# Patient Record
Sex: FEMALE | Race: WHITE | NOT HISPANIC OR LATINO | Employment: OTHER | URBAN - METROPOLITAN AREA
[De-identification: names, ages, dates, MRNs, and addresses within clinical notes are randomized per-mention and may not be internally consistent; named-entity substitution may affect disease eponyms.]

---

## 2023-10-23 DIAGNOSIS — E78.00 HYPERCHOLESTEREMIA: Primary | ICD-10-CM

## 2023-10-23 RX ORDER — ATORVASTATIN CALCIUM 10 MG/1
10 TABLET, FILM COATED ORAL DAILY
Qty: 90 TABLET | Refills: 3 | Status: SHIPPED | OUTPATIENT
Start: 2023-10-23

## 2023-12-04 PROCEDURE — 87086 URINE CULTURE/COLONY COUNT: CPT

## 2023-12-05 ENCOUNTER — LAB REQUISITION (OUTPATIENT)
Dept: LAB | Facility: HOSPITAL | Age: 63
End: 2023-12-05
Payer: MEDICARE

## 2023-12-05 DIAGNOSIS — N39.0 URINARY TRACT INFECTION, SITE NOT SPECIFIED: ICD-10-CM

## 2023-12-05 LAB — BACTERIA UR CULT: NORMAL

## 2024-01-08 ENCOUNTER — LAB (OUTPATIENT)
Dept: LAB | Facility: LAB | Age: 64
End: 2024-01-08
Payer: MEDICARE

## 2024-01-08 DIAGNOSIS — M25.461 EFFUSION, RIGHT KNEE: Primary | ICD-10-CM

## 2024-01-08 LAB
CRP SERPL-MCNC: 1.1 MG/DL (ref 0–2)
ERYTHROCYTE [SEDIMENTATION RATE] IN BLOOD BY WESTERGREN METHOD: 6 MM/H (ref 0–30)

## 2024-01-08 PROCEDURE — 36415 COLL VENOUS BLD VENIPUNCTURE: CPT

## 2024-01-08 PROCEDURE — 85652 RBC SED RATE AUTOMATED: CPT

## 2024-01-08 PROCEDURE — 86140 C-REACTIVE PROTEIN: CPT

## 2024-01-16 ENCOUNTER — LAB (OUTPATIENT)
Dept: LAB | Facility: LAB | Age: 64
End: 2024-01-16
Payer: MEDICARE

## 2024-01-16 DIAGNOSIS — E78.00 PURE HYPERCHOLESTEROLEMIA, UNSPECIFIED: Primary | ICD-10-CM

## 2024-01-16 DIAGNOSIS — R03.0 ELEVATED BLOOD-PRESSURE READING, WITHOUT DIAGNOSIS OF HYPERTENSION: ICD-10-CM

## 2024-01-16 LAB
ALBUMIN SERPL-MCNC: 4.7 G/DL (ref 3.5–5)
ALP BLD-CCNC: 80 U/L (ref 35–125)
ALT SERPL-CCNC: 14 U/L (ref 5–40)
ANION GAP SERPL CALC-SCNC: 14 MMOL/L
APPEARANCE UR: CLEAR
AST SERPL-CCNC: 16 U/L (ref 5–40)
BASOPHILS # BLD AUTO: 0.1 X10*3/UL (ref 0–0.1)
BASOPHILS NFR BLD AUTO: 1 %
BILIRUB SERPL-MCNC: 0.4 MG/DL (ref 0.1–1.2)
BILIRUB UR STRIP.AUTO-MCNC: ABNORMAL MG/DL
BUN SERPL-MCNC: 12 MG/DL (ref 8–25)
CALCIUM SERPL-MCNC: 9.9 MG/DL (ref 8.5–10.4)
CHLORIDE SERPL-SCNC: 100 MMOL/L (ref 97–107)
CHOLEST SERPL-MCNC: 169 MG/DL (ref 133–200)
CHOLEST/HDLC SERPL: 4.6 {RATIO}
CO2 SERPL-SCNC: 25 MMOL/L (ref 24–31)
COLOR UR: ABNORMAL
CREAT SERPL-MCNC: 0.8 MG/DL (ref 0.4–1.6)
EGFRCR SERPLBLD CKD-EPI 2021: 82 ML/MIN/1.73M*2
EOSINOPHIL # BLD AUTO: 0.29 X10*3/UL (ref 0–0.7)
EOSINOPHIL NFR BLD AUTO: 3 %
ERYTHROCYTE [DISTWIDTH] IN BLOOD BY AUTOMATED COUNT: 12.6 % (ref 11.5–14.5)
GLUCOSE SERPL-MCNC: 97 MG/DL (ref 65–99)
GLUCOSE UR STRIP.AUTO-MCNC: NORMAL MG/DL
HCT VFR BLD AUTO: 37.4 % (ref 36–46)
HDLC SERPL-MCNC: 37 MG/DL
HGB BLD-MCNC: 12.2 G/DL (ref 12–16)
IMM GRANULOCYTES # BLD AUTO: 0.03 X10*3/UL (ref 0–0.7)
IMM GRANULOCYTES NFR BLD AUTO: 0.3 % (ref 0–0.9)
KETONES UR STRIP.AUTO-MCNC: NEGATIVE MG/DL
LDLC SERPL CALC-MCNC: 82 MG/DL (ref 65–130)
LEUKOCYTE ESTERASE UR QL STRIP.AUTO: NEGATIVE
LYMPHOCYTES # BLD AUTO: 3.49 X10*3/UL (ref 1.2–4.8)
LYMPHOCYTES NFR BLD AUTO: 35.9 %
MCH RBC QN AUTO: 31.4 PG (ref 26–34)
MCHC RBC AUTO-ENTMCNC: 32.6 G/DL (ref 32–36)
MCV RBC AUTO: 96 FL (ref 80–100)
MONOCYTES # BLD AUTO: 0.72 X10*3/UL (ref 0.1–1)
MONOCYTES NFR BLD AUTO: 7.4 %
NEUTROPHILS # BLD AUTO: 5.08 X10*3/UL (ref 1.2–7.7)
NEUTROPHILS NFR BLD AUTO: 52.4 %
NITRITE UR QL STRIP.AUTO: NEGATIVE
NRBC BLD-RTO: 0 /100 WBCS (ref 0–0)
PH UR STRIP.AUTO: 5.5 [PH]
PLATELET # BLD AUTO: 276 X10*3/UL (ref 150–450)
POTASSIUM SERPL-SCNC: 4.7 MMOL/L (ref 3.4–5.1)
PROT SERPL-MCNC: 7.4 G/DL (ref 5.9–7.9)
PROT UR STRIP.AUTO-MCNC: NEGATIVE MG/DL
RBC # BLD AUTO: 3.88 X10*6/UL (ref 4–5.2)
RBC # UR STRIP.AUTO: NEGATIVE /UL
SODIUM SERPL-SCNC: 139 MMOL/L (ref 133–145)
SP GR UR STRIP.AUTO: 1.01
TRIGL SERPL-MCNC: 249 MG/DL (ref 40–150)
UROBILINOGEN UR STRIP.AUTO-MCNC: NORMAL MG/DL
WBC # BLD AUTO: 9.7 X10*3/UL (ref 4.4–11.3)

## 2024-01-16 PROCEDURE — 81003 URINALYSIS AUTO W/O SCOPE: CPT

## 2024-01-16 PROCEDURE — 36415 COLL VENOUS BLD VENIPUNCTURE: CPT

## 2024-01-16 PROCEDURE — 80061 LIPID PANEL: CPT

## 2024-01-16 PROCEDURE — 80053 COMPREHEN METABOLIC PANEL: CPT

## 2024-01-16 PROCEDURE — 85025 COMPLETE CBC W/AUTO DIFF WBC: CPT

## 2024-01-19 ASSESSMENT — PROMIS GLOBAL HEALTH SCALE
RATE_SOCIAL_SATISFACTION: EXCELLENT
RATE_PHYSICAL_HEALTH: VERY GOOD
EMOTIONAL_PROBLEMS: NEVER
CARRYOUT_PHYSICAL_ACTIVITIES: COMPLETELY
RATE_GENERAL_HEALTH: VERY GOOD
CARRYOUT_SOCIAL_ACTIVITIES: EXCELLENT
RATE_MENTAL_HEALTH: EXCELLENT
RATE_QUALITY_OF_LIFE: EXCELLENT
RATE_AVERAGE_PAIN: 4

## 2024-01-22 ENCOUNTER — OFFICE VISIT (OUTPATIENT)
Dept: PRIMARY CARE | Facility: CLINIC | Age: 64
End: 2024-01-22
Payer: MEDICARE

## 2024-01-22 VITALS
HEIGHT: 64 IN | WEIGHT: 177 LBS | BODY MASS INDEX: 30.22 KG/M2 | HEART RATE: 111 BPM | DIASTOLIC BLOOD PRESSURE: 84 MMHG | OXYGEN SATURATION: 97 % | RESPIRATION RATE: 14 BRPM | SYSTOLIC BLOOD PRESSURE: 144 MMHG

## 2024-01-22 DIAGNOSIS — E78.00 PURE HYPERCHOLESTEROLEMIA: ICD-10-CM

## 2024-01-22 DIAGNOSIS — Z00.00 WELL ADULT EXAM: Primary | ICD-10-CM

## 2024-01-22 DIAGNOSIS — R73.01 IMPAIRED FASTING GLUCOSE: ICD-10-CM

## 2024-01-22 DIAGNOSIS — Z12.31 ENCOUNTER FOR SCREENING MAMMOGRAM FOR MALIGNANT NEOPLASM OF BREAST: ICD-10-CM

## 2024-01-22 DIAGNOSIS — Z00.00 WELCOME TO MEDICARE PREVENTIVE VISIT: ICD-10-CM

## 2024-01-22 DIAGNOSIS — K21.00 GASTROESOPHAGEAL REFLUX DISEASE WITH ESOPHAGITIS WITHOUT HEMORRHAGE: ICD-10-CM

## 2024-01-22 PROBLEM — R03.0 FINDING OF ABOVE NORMAL BLOOD PRESSURE: Status: ACTIVE | Noted: 2023-07-13

## 2024-01-22 PROCEDURE — 1036F TOBACCO NON-USER: CPT | Performed by: FAMILY MEDICINE

## 2024-01-22 PROCEDURE — G0438 PPPS, INITIAL VISIT: HCPCS | Performed by: FAMILY MEDICINE

## 2024-01-22 PROCEDURE — 99212 OFFICE O/P EST SF 10 MIN: CPT | Performed by: FAMILY MEDICINE

## 2024-01-22 PROCEDURE — 1159F MED LIST DOCD IN RCRD: CPT | Performed by: FAMILY MEDICINE

## 2024-01-22 PROCEDURE — 1125F AMNT PAIN NOTED PAIN PRSNT: CPT | Performed by: FAMILY MEDICINE

## 2024-01-22 PROCEDURE — 93000 ELECTROCARDIOGRAM COMPLETE: CPT | Performed by: FAMILY MEDICINE

## 2024-01-22 PROCEDURE — 3008F BODY MASS INDEX DOCD: CPT | Performed by: FAMILY MEDICINE

## 2024-01-22 RX ORDER — LORATADINE 10 MG/1
10 TABLET ORAL DAILY
COMMUNITY

## 2024-01-22 RX ORDER — ETODOLAC 500 MG/1
500 TABLET, FILM COATED ORAL DAILY
COMMUNITY
Start: 2023-03-10

## 2024-01-22 ASSESSMENT — ENCOUNTER SYMPTOMS
DEPRESSION: 0
LOSS OF SENSATION IN FEET: 0
OCCASIONAL FEELINGS OF UNSTEADINESS: 0

## 2024-01-22 ASSESSMENT — VISUAL ACUITY
OS_CC: 20/50
OD_CC: 20/50

## 2024-01-22 ASSESSMENT — PATIENT HEALTH QUESTIONNAIRE - PHQ9
2. FEELING DOWN, DEPRESSED OR HOPELESS: NOT AT ALL
SUM OF ALL RESPONSES TO PHQ9 QUESTIONS 1 AND 2: 0
1. LITTLE INTEREST OR PLEASURE IN DOING THINGS: NOT AT ALL

## 2024-01-22 ASSESSMENT — PAIN SCALES - GENERAL: PAINLEVEL: 3

## 2024-01-22 NOTE — ASSESSMENT & PLAN NOTE
Keep off medication.  I suspect there is an element of whitecoat syndrome.  Continue monitoring home BP.  If BP runs high at home she is to return here.

## 2024-01-22 NOTE — PROGRESS NOTES
Subjective   Reason for Visit: Mague Hummel is an 63 y.o. female here for a Medicare Wellness visit.          Reviewed all medications by prescribing practitioner or clinical pharmacist (such as prescriptions, OTCs, herbal therapies and supplements) and documented in the medical record.    HPI     Had benign breast mass (right) removed in 2/06. Colonoscopy in 2020 was normal by Dr. Goldstein. Repeat due in 2025 (family HX).   GYN history -. Last Pap was in 2021 and was normal . HPV was negative. Last period was in 2008. She has not had vaginal bleeding since then.  She had a uterine ablation in 2007.   An ultrasound in 2020 showed a uterine fibroid.  Since she currently is asymptomatic with that no treatment is necessary at this time.     2. MAGUE is seen today also for follow up of High cholesterol.  She is on atorvastatin 10 mg. Recent LDL is 82.     3. MAGUE is seen today also for follow up of allergies.  She takes Allegra. She also uses albuterol MDI PRN.     4. MAGUE is seen for also for follow up for GERD.  She is doing well on omeprazole.     5. MAGUE is seen also for follow up of Osteoarthritis. She is status post right knee replacement by Dr. Eller in 7/2024.  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 She takes etodolac.      6.  MAGUE is also here for elevated blood pressure.  She is on no medication.Home BPs usually in the 120s/70s.      7.  MAGUE  is also here for elevated sugar.  No history of diabetes.  There is a family history of diabetes. Recent FBS is 97. Recent A1C 5.8.    Review of Systems  Constitutional symptoms: No fever, loss of appetite, headaches, fatigue.  Eyes: Negative for vision loss or blurred or double vision.  ENT: Negative for hearing loss, tinnitus, nasal congestion, rhinorrhea, nosebleeds, teeth problems, mouth sores, sore throat or dysphagia.  Cardiovascular: Negative for chest pain/pressure, palpitations, edema, claudication.  Respiratory: Negative  "for shortness of breath, dyspnea on exertion, pain with breathing or coughing.  Breast: Negative for tenderness, masses, gynecomastia or discharge.  Gastrointestinal: Negative for anorexia, nausea, vomiting, indigestion, pain, change in bowel habits or blood in stool.  Musculoskeletal: Negative for joint pain, joint swelling, myalgias or cramps.  Skin: Negative for rash, changing skin lesions.  Neurological: Negative for headache, numbness, tingling, weakness or tremors.  Psychiatric: Negative for depression or anxiety.  Endocrine: Negative for marked change in weight, heat or cold intolerance, polyuria, polydipsia or polyphagia.  Hematologic: Negative for bruising or abnormal bleeding.    Objective   Vitals:  /84   Pulse (!) 111   Resp 14   Ht 1.619 m (5' 3.75\")   Wt 80.3 kg (177 lb)   SpO2 97%   BMI 30.62 kg/m²       Physical Exam  General appearance: Comfortable.  She is overweight.  She is awake, alert, and oriented and appears her stated age.The patient is cooperative with exam.  Head: Hair pattern reveals a normal pattern for patient's age and face shows no abnormalities.  eyes: PERRLA, EOMI x2, conjunctival and sclera are clear.   Nose: No polyps, ulcerations, or lesions.  Throat: Oral mucosa reveals no abnormalities and teeth are in good repair.  Neck:  Supple without lymphadenopathy.  No thyromegaly or carotid bruits.  Lungs: Clear to auscultation bilaterally with no wheezes, rales or rhonchi.  Chest Wall: No abnormalities  Breast: Bilateral breasts are symmetrical without masses or discharge or tenderness.  Abdomen: Abdomen is soft, nontender, no masses and no hepatosplenomegaly.  Genitourinary: Labia reveal no lesions.  Vaginal mucosa reveals no abnormalities.  Cervix reveals no abnormalities.  Negative chandelier sign.  Uterus is normal in size, shape and position.Bilateral adnexa reveals no masses or tenderness.  Lymph nodes: Bilateral axillary lymph nodes and inguinal nodes are " unremarkable.  Musculoskeletal: Bilateral upper and lower extremities are equal in strength at 5/5.  Skin: Good turgor and no rashes.  Neurological: Intact and nonfocal.  Cranial nerves II through XII are grossly intact.  Psychiatric: Patient has appropriate judgment.  Patient has good insight.  Patient's mood is appropriate.    Assessment/Plan   Problem List Items Addressed This Visit          High    Gastro-esophageal reflux disease with esophagitis    Current Assessment & Plan     Continue current medications.  Recheck in 6 months.           Impaired fasting glucose    Current Assessment & Plan     Continue off medication.  Continue to cut back on carbohydrates in her diet.  Will check A1c again next year.         Pure hypercholesterolemia    Current Assessment & Plan     Continue current medications.  Recheck in 6 months.           Well adult exam - Primary    Current Assessment & Plan     Anticipatory guidance given. She declines Prevnar 20 at this time.          Other Visit Diagnoses       Welcome to Medicare preventive visit        Relevant Orders    ECG 12 lead (Clinic Performed) (Completed)    BI mammo bilateral screening tomosynthesis    Encounter for screening mammogram for malignant neoplasm of breast        Relevant Orders    BI mammo bilateral screening tomosynthesis

## 2024-01-22 NOTE — ASSESSMENT & PLAN NOTE
Continue off medication.  Continue to cut back on carbohydrates in her diet.  Will check A1c again next year.

## 2024-01-30 ENCOUNTER — HOSPITAL ENCOUNTER (OUTPATIENT)
Dept: RADIOLOGY | Facility: CLINIC | Age: 64
Discharge: HOME | End: 2024-01-30
Payer: MEDICARE

## 2024-01-30 VITALS — BODY MASS INDEX: 29.02 KG/M2 | HEIGHT: 64 IN | WEIGHT: 170 LBS

## 2024-01-30 DIAGNOSIS — Z12.31 ENCOUNTER FOR SCREENING MAMMOGRAM FOR MALIGNANT NEOPLASM OF BREAST: ICD-10-CM

## 2024-01-30 DIAGNOSIS — Z00.00 WELCOME TO MEDICARE PREVENTIVE VISIT: ICD-10-CM

## 2024-01-30 PROCEDURE — 77063 BREAST TOMOSYNTHESIS BI: CPT

## 2024-04-01 ENCOUNTER — LAB REQUISITION (OUTPATIENT)
Dept: LAB | Facility: HOSPITAL | Age: 64
End: 2024-04-01
Payer: MEDICARE

## 2024-04-01 DIAGNOSIS — N39.0 URINARY TRACT INFECTION, SITE NOT SPECIFIED: ICD-10-CM

## 2024-04-01 PROCEDURE — 87186 SC STD MICRODIL/AGAR DIL: CPT

## 2024-04-01 PROCEDURE — 87086 URINE CULTURE/COLONY COUNT: CPT

## 2024-04-04 ENCOUNTER — TELEPHONE (OUTPATIENT)
Dept: PRIMARY CARE | Facility: CLINIC | Age: 64
End: 2024-04-04
Payer: MEDICARE

## 2024-04-04 DIAGNOSIS — N39.0 ACUTE UTI: Primary | ICD-10-CM

## 2024-04-04 LAB — BACTERIA UR CULT: ABNORMAL

## 2024-04-04 RX ORDER — NITROFURANTOIN 25; 75 MG/1; MG/1
100 CAPSULE ORAL 2 TIMES DAILY
Qty: 14 CAPSULE | Refills: 0 | Status: SHIPPED | OUTPATIENT
Start: 2024-04-04 | End: 2024-04-17 | Stop reason: HOSPADM

## 2024-04-04 NOTE — TELEPHONE ENCOUNTER
Patient informed voice educated and understanding. Patient has an appointment with JOSE F 4/11/2024

## 2024-04-04 NOTE — TELEPHONE ENCOUNTER
Please call patient to let her know that she will get better coverage by stopping her Keflex and starting Macrobid.  I sent in the prescription to [facility].  She should make an appointment in our office for recheck when the antibiotics are complete in one week.

## 2024-04-04 NOTE — TELEPHONE ENCOUNTER
Patient was d/x with an UTI on Monday 04/01/24. She went to an urgent care that day. Was put on Keflex 500 mg, take 4 times a day for 7 days.  Patient received a message today through her my chart stating her sample was also positive for Ecoli . Patient would like to know if what she is currently on, will that kill the Ecoli or does she need a new prescription? She does have knee surgery set for 04/16 with Dr Eller and needs to be infectious free. [facility] [place] [facility] [address]

## 2024-04-11 ENCOUNTER — APPOINTMENT (OUTPATIENT)
Dept: PREADMISSION TESTING | Facility: HOSPITAL | Age: 64
End: 2024-04-11
Payer: MEDICARE

## 2024-04-11 ENCOUNTER — OFFICE VISIT (OUTPATIENT)
Dept: PRIMARY CARE | Facility: CLINIC | Age: 64
End: 2024-04-11
Payer: MEDICARE

## 2024-04-11 VITALS
OXYGEN SATURATION: 96 % | SYSTOLIC BLOOD PRESSURE: 152 MMHG | RESPIRATION RATE: 16 BRPM | BODY MASS INDEX: 30.55 KG/M2 | TEMPERATURE: 98 F | DIASTOLIC BLOOD PRESSURE: 82 MMHG | WEIGHT: 178 LBS | HEART RATE: 110 BPM

## 2024-04-11 DIAGNOSIS — R03.0 FINDING OF ABOVE NORMAL BLOOD PRESSURE: ICD-10-CM

## 2024-04-11 DIAGNOSIS — Z01.818 PRE-OP EVALUATION: ICD-10-CM

## 2024-04-11 DIAGNOSIS — E78.00 PURE HYPERCHOLESTEROLEMIA: ICD-10-CM

## 2024-04-11 DIAGNOSIS — Z01.818 PRE-OPERATIVE EXAM: Primary | ICD-10-CM

## 2024-04-11 PROCEDURE — 93000 ELECTROCARDIOGRAM COMPLETE: CPT | Performed by: FAMILY MEDICINE

## 2024-04-11 PROCEDURE — 1159F MED LIST DOCD IN RCRD: CPT | Performed by: FAMILY MEDICINE

## 2024-04-11 PROCEDURE — 99214 OFFICE O/P EST MOD 30 MIN: CPT | Performed by: FAMILY MEDICINE

## 2024-04-11 PROCEDURE — 1036F TOBACCO NON-USER: CPT | Performed by: FAMILY MEDICINE

## 2024-04-11 PROCEDURE — 3008F BODY MASS INDEX DOCD: CPT | Performed by: FAMILY MEDICINE

## 2024-04-11 PROCEDURE — 1125F AMNT PAIN NOTED PAIN PRSNT: CPT | Performed by: FAMILY MEDICINE

## 2024-04-11 ASSESSMENT — PATIENT HEALTH QUESTIONNAIRE - PHQ9
2. FEELING DOWN, DEPRESSED OR HOPELESS: NOT AT ALL
1. LITTLE INTEREST OR PLEASURE IN DOING THINGS: NOT AT ALL
SUM OF ALL RESPONSES TO PHQ9 QUESTIONS 1 AND 2: 0

## 2024-04-11 ASSESSMENT — ENCOUNTER SYMPTOMS
LOSS OF SENSATION IN FEET: 0
OCCASIONAL FEELINGS OF UNSTEADINESS: 0
DEPRESSION: 0

## 2024-04-11 ASSESSMENT — PAIN SCALES - GENERAL: PAINLEVEL: 6

## 2024-04-11 NOTE — ASSESSMENT & PLAN NOTE
She just had extensive blood work for her physical in 1/24 with no concerns.  Will not get preoperative blood work at this time.  EKG shows no acute changes.  Will clear her for surgery.

## 2024-04-11 NOTE — PROGRESS NOTES
Subjective   Patient ID: Mague Hummel is a 63 y.o. female who presents for Pre-op Exam (Pre surgical clearance knee surgery 4/16/2024).    HPI   She is here for preop examination.  She is scheduled to have  right knee  surgery on 4/16/2024 by Dr. Eller. She had total knee replacement of the right in 7/23 and she has had increased swelling and redness over the past several months and is going to reopen it.    2. She also follow high cholesterol.  She is on a atorvastatin 10 mg.  Recent LDL is 82.    3. She is also here for elevated sugar.  She has no history of diabetes.  Recent A1c is 5.8 and FBS is 97.  She is on no medication.    4. She is also here for borderline elevated blood pressure.  She is on no medication.    Review of Systems  Denies headache, blurred vision, chest pain, shortness of breath, nausea or vomiting, change in bowel habits or leg pain or swelling.    Objective   /82 (BP Location: Left arm, Patient Position: Sitting, BP Cuff Size: Large adult)   Pulse 110   Temp 36.7 °C (98 °F)   Resp 16   Wt 80.7 kg (178 lb)   SpO2 96%   BMI 30.55 kg/m²     Physical Exam  Vitals and nurse's notes reviewed  General: no acute distress  HEENT: Normal  Neck: Supple  Lungs: Clear  Cardio: RRR w/o murmur  Abdomen: Soft, nontender, no hepatosplenomegaly  Extremities: No edema, no calf tenderness. Right knee with swelling and some warmth.  Neuro: Alert and oriented with no focal deficits    Assessment/Plan   Problem List Items Addressed This Visit             ICD-10-CM       High    Pure hypercholesterolemia E78.00     Continue current medication.            Medium    Finding of above normal blood pressure R03.0     Borderline elevation.  She is having preop evaluation at the hospital tomorrow so can recheck there.  At this point we will hold off in any medication.  Perhaps this is related to her pain.  Is not high enough to prevent surgery.  If she has persistent elevated blood pressure in the future  may need to start medication.         Pre-op evaluation Z01.818     She just had extensive blood work for her physical in 1/24 with no concerns.  Will not get preoperative blood work at this time.  EKG shows no acute changes.  Will clear her for surgery.          Other Visit Diagnoses         Codes    Pre-operative exam    -  Primary Z01.818    Relevant Orders    ECG 12 lead (Clinic Performed)

## 2024-04-11 NOTE — ASSESSMENT & PLAN NOTE
Borderline elevation.  She is having preop evaluation at the hospital tomorrow so can recheck there.  At this point we will hold off in any medication.  Perhaps this is related to her pain.  Is not high enough to prevent surgery.  If she has persistent elevated blood pressure in the future may need to start medication.

## 2024-04-12 ENCOUNTER — PRE-ADMISSION TESTING (OUTPATIENT)
Dept: PREADMISSION TESTING | Facility: HOSPITAL | Age: 64
End: 2024-04-12
Payer: MEDICARE

## 2024-04-12 ENCOUNTER — APPOINTMENT (OUTPATIENT)
Dept: PREADMISSION TESTING | Facility: HOSPITAL | Age: 64
End: 2024-04-12
Payer: MEDICARE

## 2024-04-12 VITALS
RESPIRATION RATE: 18 BRPM | OXYGEN SATURATION: 100 % | BODY MASS INDEX: 31.05 KG/M2 | HEIGHT: 64 IN | TEMPERATURE: 97.3 F | HEART RATE: 97 BPM | DIASTOLIC BLOOD PRESSURE: 72 MMHG | WEIGHT: 181.88 LBS | SYSTOLIC BLOOD PRESSURE: 168 MMHG

## 2024-04-12 DIAGNOSIS — Z01.818 PREOP TESTING: Primary | ICD-10-CM

## 2024-04-12 PROCEDURE — 87081 CULTURE SCREEN ONLY: CPT | Mod: WESLAB

## 2024-04-12 PROCEDURE — 99203 OFFICE O/P NEW LOW 30 MIN: CPT | Performed by: PHYSICIAN ASSISTANT

## 2024-04-12 RX ORDER — ACETAMINOPHEN 500 MG
1000 TABLET ORAL 2 TIMES DAILY
COMMUNITY

## 2024-04-12 RX ORDER — IBUPROFEN 800 MG/1
800 TABLET ORAL 2 TIMES DAILY
COMMUNITY

## 2024-04-12 RX ORDER — BISMUTH SUBSALICYLATE 262 MG
1 TABLET,CHEWABLE ORAL DAILY
COMMUNITY

## 2024-04-12 RX ORDER — CHLORHEXIDINE GLUCONATE ORAL RINSE 1.2 MG/ML
15 SOLUTION DENTAL AS NEEDED
Qty: 120 ML | Refills: 0 | Status: SHIPPED | OUTPATIENT
Start: 2024-04-12 | End: 2024-04-17 | Stop reason: HOSPADM

## 2024-04-12 ASSESSMENT — PAIN SCALES - GENERAL
PAINLEVEL_OUTOF10: 6
PAINLEVEL_OUTOF10: 6

## 2024-04-12 ASSESSMENT — DUKE ACTIVITY SCORE INDEX (DASI)
CAN YOU WALK INDOORS, SUCH AS AROUND YOUR HOUSE: YES
CAN YOU DO YARD WORK LIKE RAKING LEAVES, WEEDING OR PUSHING A MOWER: YES
CAN YOU RUN A SHORT DISTANCE: NO
DASI METS SCORE: 7.3
CAN YOU PARTICIPATE IN MODERATE RECREATIONAL ACTIVITIES LIKE GOLF, BOWLING, DANCING, DOUBLES TENNIS OR THROWING A BASEBALL OR FOOTBALL: NO
CAN YOU DO LIGHT WORK AROUND THE HOUSE LIKE DUSTING OR WASHING DISHES: YES
TOTAL_SCORE: 36.7
CAN YOU DO HEAVY WORK AROUND THE HOUSE LIKE SCRUBBING FLOORS OR LIFTING AND MOVING HEAVY FURNITURE: YES
CAN YOU HAVE SEXUAL RELATIONS: YES
CAN YOU TAKE CARE OF YOURSELF (EAT, DRESS, BATHE, OR USE TOILET): YES
CAN YOU CLIMB A FLIGHT OF STAIRS OR WALK UP A HILL: YES
CAN YOU PARTICIPATE IN STRENOUS SPORTS LIKE SWIMMING, SINGLES TENNIS, FOOTBALL, BASKETBALL, OR SKIING: NO
CAN YOU WALK A BLOCK OR TWO ON LEVEL GROUND: YES
CAN YOU DO MODERATE WORK AROUND THE HOUSE LIKE VACUUMING, SWEEPING FLOORS OR CARRYING GROCERIES: YES

## 2024-04-12 ASSESSMENT — PAIN DESCRIPTION - DESCRIPTORS
DESCRIPTORS: ACHING;SHARP;STABBING
DESCRIPTORS: ACHING;BURNING;THROBBING

## 2024-04-12 ASSESSMENT — CHADS2 SCORE
AGE GREATER THAN OR EQUAL TO 75: NO
DIABETES: NO
PRIOR STROKE OR TIA OR THROMBOEMBOLISM: NO
CHADS2 SCORE: 0
CHF: NO
HYPERTENSION: NO

## 2024-04-12 ASSESSMENT — PAIN - FUNCTIONAL ASSESSMENT
PAIN_FUNCTIONAL_ASSESSMENT: 0-10
PAIN_FUNCTIONAL_ASSESSMENT: 0-10

## 2024-04-12 ASSESSMENT — LIFESTYLE VARIABLES: SMOKING_STATUS: NONSMOKER

## 2024-04-12 NOTE — PREPROCEDURE INSTRUCTIONS
Medication List            Accurate as of April 12, 2024  9:22 AM. Always use your most recent med list.                acetaminophen 500 mg tablet  Commonly known as: Tylenol  Notes to patient: May take morning of surgery if needed     atorvastatin 10 mg tablet  Commonly known as: Lipitor  TAKE 1 TABLET DAILY     chlorhexidine 0.12 % solution  Commonly known as: Peridex  Use 15 mL in the mouth or throat if needed for wound care for up to 14 days.     Claritin 10 mg tablet  Generic drug: loratadine  Medication Adjustments for Surgery: Continue until night before surgery     etodolac 500 mg tablet  Commonly known as: Lodine  Medication Adjustments for Surgery: Stop 7 days before surgery     ibuprofen 800 mg tablet  Medication Adjustments for Surgery: Stop 7 days before surgery     multivitamin tablet  Medication Adjustments for Surgery: Stop 7 days before surgery     PriLOSEC 10 mg packet for oral suspension  Generic drug: omeprazole  Medication Adjustments for Surgery: Take morning of surgery with sip of water, no other fluids                              NPO Instructions:    Do not eat any food after midnight the night before your surgery/procedure.    Additional Instructions:     Day of Surgery:  Review your medication instructions, take indicated medications  Wear  comfortable loose fitting clothing  Do not use moisturizers, creams, lotions or perfume  All jewelry and valuables should be left at home   Home Preoperative Antibacterial Shower    What is a home preoperative antibacterial shower?  This shower is a way of cleaning the skin with a germ killing solution before surgery. The solution contains chlorhexidine, commonly known as CHG. CHG is a skin cleanser with germ killing ability. Let your doctor know if you are allergic to chlorhexidine.      Why do I need to take a preoperative antibacterial shower?  Skin is not sterile. It is best to try to make your skin as free of germs as possible before surgery.  Proper cleansing with a germ killing soap before surgery can lower the number of germs on your skin. This helps to reduce the risk of infection at the surgical site. Following the instructions listed below will help you prepare your skin for surgery.    How do I use the solution?      Steps: Begin using your CHG soap APRIL 12  First, wash and rinse your hair  Keep CHG away soap away from ear canals and eyes.  Rinse completely, do not condition. Hair extensions should be removed.  Wash your face with your normal soap and rinse.  Apply the CHG solution to a clean wet washcloth. Turn the water off or move away from the water spray to avoid premature rinsing of the CHG soap as you are applying.  Firmly lather your entire body from neck down. Do not use on face.  Pay special attention to the areas(s) where your incision(s) will be located unless they are on your face.  Avoid scrubbing your skin too hard.  The important point is to have the CHG soap sit on your skin for 3 minutes.  DO NOT wash with regular soap after you have used the CHG soap solution.  Pat yourself dry with a clean, freshly laundered towel.  DO NOT apply powders, deodorants or lotions.  Dress in clean, freshly laundered night clothes.  Be sure to sleep with clean, freshly laundered sheets.  Be aware that CHG will cause stains on fabrics; if you wash them with bleach after use. Rinse your washcloth and other linens that have contact with CHG completely. Use only non-chlorine detergents to launder the items used.  The morning of surgery is the fifth day. Repeat the above steps and dress in clean comfortable clothing.   Patient Information: Oral/Dental Rinse    What is oral/dental rinse?  It is a mouthwash. It is a way of cleaning the mouth with a germ killing solution before your surgery. The solution contains chlorhexidine, commonly know as CHG.  It is used inside the mouth to kill bacteria known as Staphylococcus aureus.  Let your doctor know if you are  allergic to chlorhexidine.    Why do I need to use CHG oral/dental rinse?  The CHG oral/dental rinse helps to kill bacteria in your mouth known as Staphylococcus aureus. This reduces the risk of infection at the surgical site.    Using your CHG oral/dental rinse.  STEPS: use your CHG oral/dental rinse after you brush your teeth the night before (at bedtime) and the morning of your surgery. Follow the directions on your prescription label.  Use the cap on the container to measure 15ml (fill cap to fill line)  Swish (gargle if you can) the mouthwash in your mouth for at least 30 seconds, (do not swallow) spit out.  After you use your CHG rinse, do not rinse your mouth with water, drink or eat. Please refer to prescription label for the appropriate time to resume oral intake.  PAT DISCHARGE INSTRUCTIONS    Please call the Same Day Surgery (SDS) Department of the hospital where your procedure will be performed after 2:00 PM the day before your surgery. If you are scheduled on a Monday, or a Tuesday following a Monday holiday, you will need to call on the last business day prior to your surgery.    [facility]  [id number] [place] [address], [id number] [phone]      Please let your surgeon know if:      You develop any open sores, shingles, burning or painful urination as these may increase your risk of an infection.   You no longer wish to have the surgery.   Any other personal circumstances change that may lead to the need to cancel or defer this surgery-such as being sick or getting admitted to any hospital within one week of your planned procedure.    Your contact details change, such as a change of address or phone number.    Starting now:     Please DO NOT drink alcohol or smoke for 24 hours before surgery. It is well known that quitting smoking can make a huge difference to your health and recovery from surgery. The longer you abstain from smoking, the better your chances of  a healthy recovery. If you need help with quitting, call 5-212-QUIT-NOW to be connected to a trained counselor who will discuss the best methods to help you quit.     Before your surgery:    Please stop all supplements 7 days prior to surgery. Or as directed by your surgeon.   Please stop taking NSAID pain medicine such as Advil and Motrin 7 days before surgery.    If you develop any fever, cough, cold, rashes, cuts, scratches, scrapes, urinary symptoms or infection anywhere on your body (including teeth and gums) prior to surgery, please call your surgeon’s office as soon as possible. This may require treatment to reduce the chance of cancellation on the day of surgery.    The day before your surgery:   DIET- Please follow the diet instructions at the top of your packet.   Get a good night’s rest.  Use the special soap for bathing if you have been instructed to use one.    Scheduled surgery times may change and you will be notified if this occurs - please check your personal voicemail for any updates.     On the morning of surgery:   Wear comfortable, loose fitting clothes which open in the front. Please do not wear moisturizers, creams, lotions, makeup or perfume.    Please bring with you to surgery:   Photo ID and insurance card   Current list of medicines and allergies   Pacemaker/ Defibrillator/Heart stent cards   CPAP machine and mask    Slings/ splints/ crutches   A copy of your complete advanced directive/DHPOA.    Please do NOT bring with you to surgery:   All jewelry and valuables should be left at home.   Prosthetic devices such as contact lenses, hearing aids, dentures, eyelash extensions, hairpins and body piercings must be removed prior to going in to the surgical suite.    After outpatient surgery:   A responsible adult MUST accompany you at the time of discharge and stay with you for 24 hours after your surgery. You may NOT drive yourself home after surgery.    Do not drive, operate machinery, make  critical decisions or do activities that require co-ordination or balance until after a night’s sleep.   Do not drink alcoholic beverages for 24 hours.   Instructions for resuming your medications will be provided by your surgeon.    CALL YOUR DOCTOR AFTER SURGERY IF YOU HAVE:     Chills and/or a fever of 101° F or higher.    Redness, swelling, pus or drainage from your surgical wound or a bad smell from the wound.    Lightheadedness, fainting or confusion.    Persistent vomiting (throwing up) and are not able to eat or drink for 12 hours.    Three or more loose, watery bowel movements in 24 hours (diarrhea).   Difficulty or pain while urinating( after non-urological surgery)    Pain and swelling in your legs, especially if it is only on one side.    Difficulty breathing or are breathing faster than normal.    Any new concerning symptoms.  What side effects might I have using the CHG oral/dental rinse?  CHG rinse will stick to the plaque on the teeth. Brush and floss just before use. Teeth brushing will help avoid staining of plaque during use.    Who should I contact if I have questions about the CHG oral/dental rinse?  Please call [facility], [facility] at [phone] if you have any questions.    Who should I call if I have any questions regarding the use of CHG soap?  Call the [facility] at [phone] if you have any questions.

## 2024-04-12 NOTE — CPM/PAT H&P
CPM/PAT Evaluation       Name: Mague Hummel (Mague Hummel)  /Age: 1960/63 y.o.     In-Person       Chief Complaint: Right knee pain    HPI 63-year-old female status post right total knee replacement 2023.  Patient complains of increasing pain, swelling and clicking noise in right knee.  Patient states that since November she has noticed increased swelling and pain.  She uses a cane for stability.  She has history of GERD and hyperlipidemia.  She is scheduled for synovectomy/bursectomy with possible poly change of right total knee 2024    Past Medical History:   Diagnosis Date    Allergic     Arthritis     Breast cyst     Cataract     Fibrocystic breast     GERD (gastroesophageal reflux disease)     Hyperlipidemia        Past Surgical History:   Procedure Laterality Date    ADENOIDECTOMY      BREAST CYST EXCISION      CATARACT EXTRACTION W/ INTRAOCULAR LENS  IMPLANT, BILATERAL       SECTION, LOW TRANSVERSE      CHOLECYSTECTOMY      ENDOMETRIAL ABLATION      TONSILLECTOMY  1964    TOTAL KNEE ARTHROPLASTY Right 2023    WISDOM TOOTH EXTRACTION         Patient  reports being sexually active and has had partner(s) who are male. She reports using the following method of birth control/protection: Post-menopausal.    Family History   Problem Relation Name Age of Onset    Breast cancer Mother Jolynn Bianca 68    Colon cancer Mother Jolynn Valenzuela     Arthritis Mother Jolynnsteve Valenzuela     Hypertension Mother Jolynn Valenzuela     Breast cancer Maternal Grandmother Radha Soto     COPD Father Marquise Valenzuela     Diabetes Father Marquise Valenzuela        Allergies   Allergen Reactions    Penicillins Hives    Sulfa (Sulfonamide Antibiotics) Hives    Tetracycline Unknown       Current Outpatient Medications   Medication Instructions    acetaminophen (TYLENOL) 1,000 mg, oral, 2 times daily    atorvastatin (LIPITOR) 10 mg, oral, Daily    chlorhexidine (Peridex) 0.12 %  solution 15 mL, Mouth/Throat, As needed    etodolac (Lodine) 500 mg tablet Take 1 tablet (500 mg) by mouth once daily.    ibuprofen 800 mg, oral, 2 times daily    loratadine (Claritin) 10 mg tablet Take 1 tablet (10 mg) by mouth once daily.    multivitamin tablet 1 tablet, oral, Daily    omeprazole (PriLOSEC) 10 mg packet for oral suspension 10 mL, oral, Daily     Review of Systems   Eyes:         Patient wears glasses   All other systems reviewed and are negative.       Physical Exam  Vitals reviewed. Physical exam within normal limits.   Constitutional:       Appearance: Normal appearance.   HENT:      Mouth/Throat:      Mouth: Mucous membranes are moist.   Eyes:      Extraocular Movements: Extraocular movements intact.      Pupils: Pupils are equal, round, and reactive to light.   Neck:      Vascular: No carotid bruit.   Cardiovascular:      Rate and Rhythm: Normal rate and regular rhythm.      Pulses: Normal pulses.      Heart sounds: Normal heart sounds.   Pulmonary:      Effort: Pulmonary effort is normal.      Breath sounds: Normal breath sounds.   Abdominal:      General: Bowel sounds are normal.      Palpations: Abdomen is soft.   Musculoskeletal:         General: Normal range of motion.      Cervical back: Normal range of motion.      Comments: Right knee swelling is present   Lymphadenopathy:      Cervical: No cervical adenopathy.   Skin:     General: Skin is warm and dry.   Neurological:      General: No focal deficit present.      Mental Status: She is alert and oriented to person, place, and time.      Gait: Gait abnormal.   Psychiatric:         Mood and Affect: Mood normal.         Behavior: Behavior normal.         Thought Content: Thought content normal.         Judgment: Judgment normal.          PAT AIRWAY:   Airway:     Mallampati::  I    Neck ROM::  Full      Vitals  Heart Rate:  []   Temp:  [36.3 °C (97.3 °F)-36.7 °C (98 °F)]   Resp:  [16-18]   BP: (152-168)/(72-82)   Height:  [162.6 cm  "(5' 4\")]   Weight:  [80.7 kg (178 lb)-82.5 kg (181 lb 14.1 oz)]   SpO2:  [96 %-100 %]        DASI Risk Score      Flowsheet Row Most Recent Value   DASI SCORE 36.7   METS Score (Will be calculated only when all the questions are answered) 7.3          Caprini DVT Assessment      Flowsheet Row Most Recent Value   DVT Score 12   Current Status Varicose veins, Elective major lower extremity arthroplasty   History Prior major surgery   Age 60-75 years   BMI 31-40 (Obesity)          Modified Frailty Index    No data to display       CHADS2 Stroke Risk  Current as of 28 minutes ago        N/A 3 to 100%: High Risk   2 to < 3%: Medium Risk   0 to < 2%: Low Risk     Last Change: N/A          This score determines the patient's risk of having a stroke if the patient has atrial fibrillation.        This score is not applicable to this patient. Components are not calculated.          Revised Cardiac Risk Index      Flowsheet Row Most Recent Value   Revised Cardiac Risk Calculator 0          Apfel Simplified Score      Flowsheet Row Most Recent Value   Apfel Simplified Score Calculator 2          Risk Analysis Index Results This Encounter    No data found in the last 1 encounters.       Stop Bang Score      Flowsheet Row Most Recent Value   Do you snore loudly? 0   Do you often feel tired or fatigued after your sleep? 0   Has anyone ever observed you stop breathing in your sleep? 0   Do you have or are you being treated for high blood pressure? 0   Recent BMI (Calculated) 31.2   Is BMI greater than 35 kg/m2? 0=No   Age older than 50 years old? 1=Yes   Is your neck circumference greater than 17 inches (Male) or 16 inches (Female)? 1   Gender - Male 0=No   STOP-BANG Total Score 2            Assessment and Plan:   -Inflammatory reaction right total knee     Plan: Synovectomy/bursectomy right knee 04/16/2024  -GERD stable on omeprazole  -Hyperlipidemia stable on Lipitor  -ASA 2   Caprini DVT score 12    Stop bang total score 2   " CHADS2 score 0   DASI score 36.7   METS score 7.3   RCRI score 0   Apfel score 2  -Labs 1/24/2024 reviewed, medically cleared by Dr. Pelletier 4/10/2024

## 2024-04-14 LAB — STAPHYLOCOCCUS SPEC CULT: NORMAL

## 2024-04-16 ENCOUNTER — ANESTHESIA EVENT (OUTPATIENT)
Dept: OPERATING ROOM | Facility: HOSPITAL | Age: 64
DRG: 486 | End: 2024-04-16
Payer: MEDICARE

## 2024-04-16 ENCOUNTER — HOSPITAL ENCOUNTER (OUTPATIENT)
Facility: HOSPITAL | Age: 64
Setting detail: OBSERVATION
LOS: 1 days | Discharge: HOME | DRG: 486 | End: 2024-04-17
Attending: ORTHOPAEDIC SURGERY | Admitting: ORTHOPAEDIC SURGERY
Payer: MEDICARE

## 2024-04-16 ENCOUNTER — ANESTHESIA (OUTPATIENT)
Dept: OPERATING ROOM | Facility: HOSPITAL | Age: 64
DRG: 486 | End: 2024-04-16
Payer: MEDICARE

## 2024-04-16 DIAGNOSIS — T84.53XA: Primary | ICD-10-CM

## 2024-04-16 PROCEDURE — 2500000004 HC RX 250 GENERAL PHARMACY W/ HCPCS (ALT 636 FOR OP/ED): Performed by: ORTHOPAEDIC SURGERY

## 2024-04-16 PROCEDURE — 3700000001 HC GENERAL ANESTHESIA TIME - INITIAL BASE CHARGE: Performed by: ORTHOPAEDIC SURGERY

## 2024-04-16 PROCEDURE — 2500000005 HC RX 250 GENERAL PHARMACY W/O HCPCS: Performed by: ORTHOPAEDIC SURGERY

## 2024-04-16 PROCEDURE — 7100000011 HC EXTENDED STAY RECOVERY HOURLY - NURSING UNIT

## 2024-04-16 PROCEDURE — G0378 HOSPITAL OBSERVATION PER HR: HCPCS

## 2024-04-16 PROCEDURE — 0SPC09Z REMOVAL OF LINER FROM RIGHT KNEE JOINT, OPEN APPROACH: ICD-10-PCS | Performed by: ORTHOPAEDIC SURGERY

## 2024-04-16 PROCEDURE — 2500000005 HC RX 250 GENERAL PHARMACY W/O HCPCS: Performed by: NURSE ANESTHETIST, CERTIFIED REGISTERED

## 2024-04-16 PROCEDURE — 96375 TX/PRO/DX INJ NEW DRUG ADDON: CPT | Mod: 59

## 2024-04-16 PROCEDURE — 1210000001 HC SEMI-PRIVATE ROOM DAILY

## 2024-04-16 PROCEDURE — 96365 THER/PROPH/DIAG IV INF INIT: CPT | Mod: 59

## 2024-04-16 PROCEDURE — 2500000004 HC RX 250 GENERAL PHARMACY W/ HCPCS (ALT 636 FOR OP/ED): Performed by: NURSE ANESTHETIST, CERTIFIED REGISTERED

## 2024-04-16 PROCEDURE — 3600000008 HC OR TIME - EACH INCREMENTAL 1 MINUTE - PROCEDURE LEVEL THREE: Performed by: ORTHOPAEDIC SURGERY

## 2024-04-16 PROCEDURE — A27335: Performed by: ANESTHESIOLOGY

## 2024-04-16 PROCEDURE — 2500000004 HC RX 250 GENERAL PHARMACY W/ HCPCS (ALT 636 FOR OP/ED): Performed by: ANESTHESIOLOGY

## 2024-04-16 PROCEDURE — 64447 NJX AA&/STRD FEMORAL NRV IMG: CPT | Performed by: ANESTHESIOLOGY

## 2024-04-16 PROCEDURE — 2500000001 HC RX 250 WO HCPCS SELF ADMINISTERED DRUGS (ALT 637 FOR MEDICARE OP): Performed by: ANESTHESIOLOGY

## 2024-04-16 PROCEDURE — 3700000002 HC GENERAL ANESTHESIA TIME - EACH INCREMENTAL 1 MINUTE: Performed by: ORTHOPAEDIC SURGERY

## 2024-04-16 PROCEDURE — 96360 HYDRATION IV INFUSION INIT: CPT

## 2024-04-16 PROCEDURE — A4649 SURGICAL SUPPLIES: HCPCS | Performed by: ORTHOPAEDIC SURGERY

## 2024-04-16 PROCEDURE — 87070 CULTURE OTHR SPECIMN AEROBIC: CPT | Mod: WESLAB,91 | Performed by: ORTHOPAEDIC SURGERY

## 2024-04-16 PROCEDURE — 2500000006 HC RX 250 W HCPCS SELF ADMINISTERED DRUGS (ALT 637 FOR ALL PAYERS): Mod: MUE | Performed by: ORTHOPAEDIC SURGERY

## 2024-04-16 PROCEDURE — 3600000003 HC OR TIME - INITIAL BASE CHARGE - PROCEDURE LEVEL THREE: Performed by: ORTHOPAEDIC SURGERY

## 2024-04-16 PROCEDURE — 0SUV09Z SUPPLEMENT RIGHT KNEE JOINT, TIBIAL SURFACE WITH LINER, OPEN APPROACH: ICD-10-PCS | Performed by: ORTHOPAEDIC SURGERY

## 2024-04-16 PROCEDURE — A9999 DME SUPPLY OR ACCESSORY, NOS: HCPCS | Mod: MUE | Performed by: ORTHOPAEDIC SURGERY

## 2024-04-16 PROCEDURE — 87075 CULTR BACTERIA EXCEPT BLOOD: CPT | Mod: WESLAB,91 | Performed by: ORTHOPAEDIC SURGERY

## 2024-04-16 PROCEDURE — A27335: Performed by: NURSE ANESTHETIST, CERTIFIED REGISTERED

## 2024-04-16 PROCEDURE — 7100000001 HC RECOVERY ROOM TIME - INITIAL BASE CHARGE: Performed by: ORTHOPAEDIC SURGERY

## 2024-04-16 PROCEDURE — 2780000003 HC OR 278 NO HCPCS: Performed by: ORTHOPAEDIC SURGERY

## 2024-04-16 PROCEDURE — 0SBC0ZZ EXCISION OF RIGHT KNEE JOINT, OPEN APPROACH: ICD-10-PCS | Performed by: ORTHOPAEDIC SURGERY

## 2024-04-16 PROCEDURE — 2500000001 HC RX 250 WO HCPCS SELF ADMINISTERED DRUGS (ALT 637 FOR MEDICARE OP): Performed by: ORTHOPAEDIC SURGERY

## 2024-04-16 PROCEDURE — 2720000007 HC OR 272 NO HCPCS: Performed by: ORTHOPAEDIC SURGERY

## 2024-04-16 PROCEDURE — 7100000002 HC RECOVERY ROOM TIME - EACH INCREMENTAL 1 MINUTE: Performed by: ORTHOPAEDIC SURGERY

## 2024-04-16 DEVICE — TIBIAL BEARING INSERT - PS
Type: IMPLANTABLE DEVICE | Site: KNEE | Status: FUNCTIONAL
Brand: TRIATHLON

## 2024-04-16 RX ORDER — PHENYLEPHRINE HCL IN 0.9% NACL 1 MG/10 ML
SYRINGE (ML) INTRAVENOUS AS NEEDED
Status: DISCONTINUED | OUTPATIENT
Start: 2024-04-16 | End: 2024-04-16

## 2024-04-16 RX ORDER — FENTANYL CITRATE 50 UG/ML
INJECTION, SOLUTION INTRAMUSCULAR; INTRAVENOUS AS NEEDED
Status: DISCONTINUED | OUTPATIENT
Start: 2024-04-16 | End: 2024-04-16

## 2024-04-16 RX ORDER — SODIUM CHLORIDE, SODIUM LACTATE, POTASSIUM CHLORIDE, CALCIUM CHLORIDE 600; 310; 30; 20 MG/100ML; MG/100ML; MG/100ML; MG/100ML
100 INJECTION, SOLUTION INTRAVENOUS CONTINUOUS
Status: DISCONTINUED | OUTPATIENT
Start: 2024-04-16 | End: 2024-04-16

## 2024-04-16 RX ORDER — OXYCODONE HYDROCHLORIDE 5 MG/1
10 TABLET ORAL EVERY 4 HOURS PRN
Status: DISCONTINUED | OUTPATIENT
Start: 2024-04-16 | End: 2024-04-17 | Stop reason: HOSPADM

## 2024-04-16 RX ORDER — ACETAMINOPHEN 325 MG/1
650 TABLET ORAL ONCE
Status: COMPLETED | OUTPATIENT
Start: 2024-04-16 | End: 2024-04-16

## 2024-04-16 RX ORDER — CEFAZOLIN SODIUM 2 G/100ML
2 INJECTION, SOLUTION INTRAVENOUS EVERY 8 HOURS
Status: COMPLETED | OUTPATIENT
Start: 2024-04-16 | End: 2024-04-17

## 2024-04-16 RX ORDER — PROPOFOL 10 MG/ML
INJECTION, EMULSION INTRAVENOUS AS NEEDED
Status: DISCONTINUED | OUTPATIENT
Start: 2024-04-16 | End: 2024-04-16

## 2024-04-16 RX ORDER — ALBUTEROL SULFATE 0.83 MG/ML
2.5 SOLUTION RESPIRATORY (INHALATION) ONCE
Status: DISCONTINUED | OUTPATIENT
Start: 2024-04-16 | End: 2024-04-16 | Stop reason: HOSPADM

## 2024-04-16 RX ORDER — FAMOTIDINE 20 MG/1
20 TABLET, FILM COATED ORAL ONCE
Status: COMPLETED | OUTPATIENT
Start: 2024-04-16 | End: 2024-04-16

## 2024-04-16 RX ORDER — ASPIRIN 81 MG/1
81 TABLET ORAL 2 TIMES DAILY
Status: DISCONTINUED | OUTPATIENT
Start: 2024-04-16 | End: 2024-04-17 | Stop reason: HOSPADM

## 2024-04-16 RX ORDER — OXYCODONE HCL 10 MG/1
20 TABLET, FILM COATED, EXTENDED RELEASE ORAL ONCE
Status: COMPLETED | OUTPATIENT
Start: 2024-04-16 | End: 2024-04-16

## 2024-04-16 RX ORDER — METOCLOPRAMIDE 10 MG/1
10 TABLET ORAL ONCE
Status: COMPLETED | OUTPATIENT
Start: 2024-04-16 | End: 2024-04-16

## 2024-04-16 RX ORDER — ACETAMINOPHEN 500 MG
1000 TABLET ORAL EVERY 6 HOURS
Status: DISCONTINUED | OUTPATIENT
Start: 2024-04-16 | End: 2024-04-17 | Stop reason: HOSPADM

## 2024-04-16 RX ORDER — CEFAZOLIN SODIUM 2 G/100ML
2 INJECTION, SOLUTION INTRAVENOUS ONCE
Status: COMPLETED | OUTPATIENT
Start: 2024-04-16 | End: 2024-04-16

## 2024-04-16 RX ORDER — PREGABALIN 75 MG/1
75 CAPSULE ORAL ONCE
Status: COMPLETED | OUTPATIENT
Start: 2024-04-16 | End: 2024-04-16

## 2024-04-16 RX ORDER — TRANEXAMIC ACID 100 MG/ML
INJECTION, SOLUTION INTRAVENOUS AS NEEDED
Status: DISCONTINUED | OUTPATIENT
Start: 2024-04-16 | End: 2024-04-16

## 2024-04-16 RX ORDER — CELECOXIB 200 MG/1
400 CAPSULE ORAL ONCE
Status: COMPLETED | OUTPATIENT
Start: 2024-04-16 | End: 2024-04-16

## 2024-04-16 RX ORDER — DOCUSATE SODIUM 100 MG/1
100 CAPSULE, LIQUID FILLED ORAL 2 TIMES DAILY
Status: DISCONTINUED | OUTPATIENT
Start: 2024-04-16 | End: 2024-04-17 | Stop reason: HOSPADM

## 2024-04-16 RX ORDER — DIPHENHYDRAMINE HYDROCHLORIDE 50 MG/ML
12.5 INJECTION INTRAMUSCULAR; INTRAVENOUS ONCE AS NEEDED
Status: DISCONTINUED | OUTPATIENT
Start: 2024-04-16 | End: 2024-04-16 | Stop reason: HOSPADM

## 2024-04-16 RX ORDER — MORPHINE SULFATE 2 MG/ML
2 INJECTION, SOLUTION INTRAMUSCULAR; INTRAVENOUS EVERY 2 HOUR PRN
Status: DISCONTINUED | OUTPATIENT
Start: 2024-04-16 | End: 2024-04-17 | Stop reason: HOSPADM

## 2024-04-16 RX ORDER — MIDAZOLAM HYDROCHLORIDE 1 MG/ML
2 INJECTION, SOLUTION INTRAMUSCULAR; INTRAVENOUS ONCE
Status: COMPLETED | OUTPATIENT
Start: 2024-04-16 | End: 2024-04-16

## 2024-04-16 RX ORDER — PANTOPRAZOLE SODIUM 20 MG/1
20 TABLET, DELAYED RELEASE ORAL DAILY
Status: DISCONTINUED | OUTPATIENT
Start: 2024-04-16 | End: 2024-04-17 | Stop reason: HOSPADM

## 2024-04-16 RX ORDER — ALBUTEROL SULFATE 0.83 MG/ML
2.5 SOLUTION RESPIRATORY (INHALATION) ONCE AS NEEDED
Status: DISCONTINUED | OUTPATIENT
Start: 2024-04-16 | End: 2024-04-16 | Stop reason: HOSPADM

## 2024-04-16 RX ORDER — OXYCODONE HCL 10 MG/1
10 TABLET, FILM COATED, EXTENDED RELEASE ORAL ONCE AS NEEDED
Status: DISCONTINUED | OUTPATIENT
Start: 2024-04-16 | End: 2024-04-17 | Stop reason: HOSPADM

## 2024-04-16 RX ORDER — SODIUM CHLORIDE, SODIUM LACTATE, POTASSIUM CHLORIDE, CALCIUM CHLORIDE 600; 310; 30; 20 MG/100ML; MG/100ML; MG/100ML; MG/100ML
125 INJECTION, SOLUTION INTRAVENOUS CONTINUOUS
Status: DISCONTINUED | OUTPATIENT
Start: 2024-04-16 | End: 2024-04-17 | Stop reason: HOSPADM

## 2024-04-16 RX ORDER — OXYCODONE HYDROCHLORIDE 5 MG/1
5 TABLET ORAL EVERY 4 HOURS PRN
Status: DISCONTINUED | OUTPATIENT
Start: 2024-04-16 | End: 2024-04-17 | Stop reason: HOSPADM

## 2024-04-16 RX ORDER — LIDOCAINE HYDROCHLORIDE 10 MG/ML
INJECTION INFILTRATION; PERINEURAL AS NEEDED
Status: DISCONTINUED | OUTPATIENT
Start: 2024-04-16 | End: 2024-04-16

## 2024-04-16 RX ORDER — ONDANSETRON HYDROCHLORIDE 2 MG/ML
INJECTION, SOLUTION INTRAVENOUS AS NEEDED
Status: DISCONTINUED | OUTPATIENT
Start: 2024-04-16 | End: 2024-04-16

## 2024-04-16 RX ORDER — HYDRALAZINE HYDROCHLORIDE 20 MG/ML
10 INJECTION INTRAMUSCULAR; INTRAVENOUS EVERY 30 MIN PRN
Status: DISCONTINUED | OUTPATIENT
Start: 2024-04-16 | End: 2024-04-16 | Stop reason: HOSPADM

## 2024-04-16 RX ORDER — FENTANYL CITRATE 50 UG/ML
50 INJECTION, SOLUTION INTRAMUSCULAR; INTRAVENOUS ONCE
Status: COMPLETED | OUTPATIENT
Start: 2024-04-16 | End: 2024-04-16

## 2024-04-16 RX ORDER — ONDANSETRON HYDROCHLORIDE 2 MG/ML
4 INJECTION, SOLUTION INTRAVENOUS ONCE AS NEEDED
Status: DISCONTINUED | OUTPATIENT
Start: 2024-04-16 | End: 2024-04-16 | Stop reason: HOSPADM

## 2024-04-16 RX ORDER — POLYETHYLENE GLYCOL 3350 17 G/17G
17 POWDER, FOR SOLUTION ORAL DAILY
Status: DISCONTINUED | OUTPATIENT
Start: 2024-04-16 | End: 2024-04-17 | Stop reason: HOSPADM

## 2024-04-16 RX ORDER — KETOROLAC TROMETHAMINE 30 MG/ML
15 INJECTION, SOLUTION INTRAMUSCULAR; INTRAVENOUS ONCE AS NEEDED
Status: DISCONTINUED | OUTPATIENT
Start: 2024-04-16 | End: 2024-04-16 | Stop reason: HOSPADM

## 2024-04-16 RX ORDER — KETOROLAC TROMETHAMINE 30 MG/ML
15 INJECTION, SOLUTION INTRAMUSCULAR; INTRAVENOUS EVERY 6 HOURS PRN
Status: DISCONTINUED | OUTPATIENT
Start: 2024-04-16 | End: 2024-04-17 | Stop reason: HOSPADM

## 2024-04-16 RX ORDER — ONDANSETRON HYDROCHLORIDE 2 MG/ML
4 INJECTION, SOLUTION INTRAVENOUS EVERY 8 HOURS PRN
Status: DISCONTINUED | OUTPATIENT
Start: 2024-04-16 | End: 2024-04-17 | Stop reason: HOSPADM

## 2024-04-16 RX ORDER — LABETALOL HYDROCHLORIDE 5 MG/ML
10 INJECTION, SOLUTION INTRAVENOUS ONCE AS NEEDED
Status: DISCONTINUED | OUTPATIENT
Start: 2024-04-16 | End: 2024-04-16 | Stop reason: HOSPADM

## 2024-04-16 RX ORDER — ATORVASTATIN CALCIUM 10 MG/1
10 TABLET, FILM COATED ORAL DAILY
Status: DISCONTINUED | OUTPATIENT
Start: 2024-04-16 | End: 2024-04-17 | Stop reason: HOSPADM

## 2024-04-16 RX ORDER — LORATADINE 10 MG/1
10 TABLET ORAL DAILY
Status: DISCONTINUED | OUTPATIENT
Start: 2024-04-16 | End: 2024-04-17 | Stop reason: HOSPADM

## 2024-04-16 RX ORDER — BISMUTH SUBSALICYLATE 262 MG
1 TABLET,CHEWABLE ORAL DAILY
Status: DISCONTINUED | OUTPATIENT
Start: 2024-04-16 | End: 2024-04-17 | Stop reason: HOSPADM

## 2024-04-16 RX ADMIN — FENTANYL CITRATE 50 MCG: 50 INJECTION, SOLUTION INTRAMUSCULAR; INTRAVENOUS at 13:08

## 2024-04-16 RX ADMIN — SODIUM CHLORIDE, SODIUM LACTATE, POTASSIUM CHLORIDE, AND CALCIUM CHLORIDE: 600; 310; 30; 20 INJECTION, SOLUTION INTRAVENOUS at 12:00

## 2024-04-16 RX ADMIN — FENTANYL CITRATE 50 MCG: 50 INJECTION, SOLUTION INTRAMUSCULAR; INTRAVENOUS at 10:54

## 2024-04-16 RX ADMIN — Medication 100 MCG: at 12:16

## 2024-04-16 RX ADMIN — FENTANYL CITRATE 50 MCG: 50 INJECTION, SOLUTION INTRAMUSCULAR; INTRAVENOUS at 11:10

## 2024-04-16 RX ADMIN — TRANEXAMIC ACID 1000 MG: 100 INJECTION, SOLUTION INTRAVENOUS at 12:14

## 2024-04-16 RX ADMIN — METOCLOPRAMIDE 10 MG: 10 TABLET ORAL at 09:38

## 2024-04-16 RX ADMIN — FAMOTIDINE 20 MG: 20 TABLET, FILM COATED ORAL at 09:38

## 2024-04-16 RX ADMIN — FENTANYL CITRATE 50 MCG: 50 INJECTION, SOLUTION INTRAMUSCULAR; INTRAVENOUS at 13:05

## 2024-04-16 RX ADMIN — CELECOXIB 400 MG: 200 CAPSULE ORAL at 09:38

## 2024-04-16 RX ADMIN — PREGABALIN 75 MG: 75 CAPSULE ORAL at 09:42

## 2024-04-16 RX ADMIN — HYDROMORPHONE HYDROCHLORIDE 0.5 MG: 1 INJECTION, SOLUTION INTRAMUSCULAR; INTRAVENOUS; SUBCUTANEOUS at 13:22

## 2024-04-16 RX ADMIN — ONDANSETRON 4 MG: 2 INJECTION INTRAMUSCULAR; INTRAVENOUS at 18:06

## 2024-04-16 RX ADMIN — CEFAZOLIN SODIUM 2 G: 2 INJECTION, SOLUTION INTRAVENOUS at 20:11

## 2024-04-16 RX ADMIN — ACETAMINOPHEN 650 MG: 325 TABLET ORAL at 09:38

## 2024-04-16 RX ADMIN — SODIUM CHLORIDE, SODIUM LACTATE, POTASSIUM CHLORIDE, AND CALCIUM CHLORIDE 125 ML/HR: 600; 310; 30; 20 INJECTION, SOLUTION INTRAVENOUS at 18:02

## 2024-04-16 RX ADMIN — FENTANYL CITRATE 25 MCG: 50 INJECTION, SOLUTION INTRAMUSCULAR; INTRAVENOUS at 11:20

## 2024-04-16 RX ADMIN — DOCUSATE SODIUM 100 MG: 100 CAPSULE, LIQUID FILLED ORAL at 20:11

## 2024-04-16 RX ADMIN — Medication 100 MCG: at 12:25

## 2024-04-16 RX ADMIN — Medication 100 MCG: at 12:19

## 2024-04-16 RX ADMIN — ONDANSETRON HYDROCHLORIDE 4 MG: 2 INJECTION INTRAMUSCULAR; INTRAVENOUS at 12:20

## 2024-04-16 RX ADMIN — PANTOPRAZOLE SODIUM 20 MG: 20 TABLET, DELAYED RELEASE ORAL at 18:06

## 2024-04-16 RX ADMIN — TRANEXAMIC ACID 1000 MG: 100 INJECTION, SOLUTION INTRAVENOUS at 11:00

## 2024-04-16 RX ADMIN — SODIUM CHLORIDE, SODIUM LACTATE, POTASSIUM CHLORIDE, AND CALCIUM CHLORIDE 100 ML/HR: 600; 310; 30; 20 INJECTION, SOLUTION INTRAVENOUS at 09:38

## 2024-04-16 RX ADMIN — OXYCODONE HYDROCHLORIDE 20 MG: 10 TABLET, FILM COATED, EXTENDED RELEASE ORAL at 09:38

## 2024-04-16 RX ADMIN — DEXAMETHASONE SODIUM PHOSPHATE 10 MG: 4 INJECTION, SOLUTION INTRAMUSCULAR; INTRAVENOUS at 11:01

## 2024-04-16 RX ADMIN — POVIDONE-IODINE 1 APPLICATION: 5 SOLUTION TOPICAL at 09:39

## 2024-04-16 RX ADMIN — FENTANYL CITRATE 50 MCG: 50 INJECTION INTRAMUSCULAR; INTRAVENOUS at 10:02

## 2024-04-16 RX ADMIN — CEFAZOLIN SODIUM 2 G: 2 INJECTION, SOLUTION INTRAVENOUS at 10:49

## 2024-04-16 RX ADMIN — ATORVASTATIN CALCIUM 10 MG: 10 TABLET, FILM COATED ORAL at 20:11

## 2024-04-16 RX ADMIN — Medication 100 MCG: at 11:16

## 2024-04-16 RX ADMIN — HYDROMORPHONE HYDROCHLORIDE 0.5 MG: 1 INJECTION, SOLUTION INTRAMUSCULAR; INTRAVENOUS; SUBCUTANEOUS at 13:34

## 2024-04-16 RX ADMIN — MIDAZOLAM HYDROCHLORIDE 2 MG: 1 INJECTION, SOLUTION INTRAMUSCULAR; INTRAVENOUS at 10:03

## 2024-04-16 RX ADMIN — FENTANYL CITRATE 25 MCG: 50 INJECTION, SOLUTION INTRAMUSCULAR; INTRAVENOUS at 12:57

## 2024-04-16 RX ADMIN — LIDOCAINE HYDROCHLORIDE 40 MG: 10 INJECTION, SOLUTION INFILTRATION; PERINEURAL at 10:54

## 2024-04-16 RX ADMIN — FENTANYL CITRATE 25 MCG: 50 INJECTION, SOLUTION INTRAMUSCULAR; INTRAVENOUS at 11:22

## 2024-04-16 RX ADMIN — PROPOFOL 150 MG: 10 INJECTION, EMULSION INTRAVENOUS at 10:54

## 2024-04-16 RX ADMIN — FENTANYL CITRATE 50 MCG: 50 INJECTION, SOLUTION INTRAMUSCULAR; INTRAVENOUS at 12:08

## 2024-04-16 RX ADMIN — ACETAMINOPHEN 1000 MG: 500 TABLET ORAL at 18:06

## 2024-04-16 RX ADMIN — FENTANYL CITRATE 50 MCG: 50 INJECTION, SOLUTION INTRAMUSCULAR; INTRAVENOUS at 11:25

## 2024-04-16 RX ADMIN — ASPIRIN 81 MG: 81 TABLET, COATED ORAL at 20:11

## 2024-04-16 RX ADMIN — FENTANYL CITRATE 25 MCG: 50 INJECTION, SOLUTION INTRAMUSCULAR; INTRAVENOUS at 12:48

## 2024-04-16 SDOH — SOCIAL STABILITY: SOCIAL INSECURITY: WERE YOU ABLE TO COMPLETE ALL THE BEHAVIORAL HEALTH SCREENINGS?: YES

## 2024-04-16 SDOH — SOCIAL STABILITY: SOCIAL INSECURITY: HAVE YOU HAD THOUGHTS OF HARMING ANYONE ELSE?: NO

## 2024-04-16 SDOH — SOCIAL STABILITY: SOCIAL INSECURITY: ARE YOU OR HAVE YOU BEEN THREATENED OR ABUSED PHYSICALLY, EMOTIONALLY, OR SEXUALLY BY ANYONE?: NO

## 2024-04-16 SDOH — SOCIAL STABILITY: SOCIAL INSECURITY: DO YOU FEEL ANYONE HAS EXPLOITED OR TAKEN ADVANTAGE OF YOU FINANCIALLY OR OF YOUR PERSONAL PROPERTY?: NO

## 2024-04-16 SDOH — HEALTH STABILITY: MENTAL HEALTH: CURRENT SMOKER: 0

## 2024-04-16 SDOH — SOCIAL STABILITY: SOCIAL INSECURITY: HAS ANYONE EVER THREATENED TO HURT YOUR FAMILY OR YOUR PETS?: NO

## 2024-04-16 SDOH — SOCIAL STABILITY: SOCIAL INSECURITY: DO YOU FEEL UNSAFE GOING BACK TO THE PLACE WHERE YOU ARE LIVING?: NO

## 2024-04-16 SDOH — SOCIAL STABILITY: SOCIAL INSECURITY: DOES ANYONE TRY TO KEEP YOU FROM HAVING/CONTACTING OTHER FRIENDS OR DOING THINGS OUTSIDE YOUR HOME?: NO

## 2024-04-16 SDOH — SOCIAL STABILITY: SOCIAL INSECURITY: ARE THERE ANY APPARENT SIGNS OF INJURIES/BEHAVIORS THAT COULD BE RELATED TO ABUSE/NEGLECT?: NO

## 2024-04-16 SDOH — SOCIAL STABILITY: SOCIAL INSECURITY: ABUSE: ADULT

## 2024-04-16 SDOH — SOCIAL STABILITY: SOCIAL INSECURITY: HAVE YOU HAD ANY THOUGHTS OF HARMING ANYONE ELSE?: NO

## 2024-04-16 ASSESSMENT — PAIN SCALES - GENERAL
PAINLEVEL_OUTOF10: 5 - MODERATE PAIN
PAIN_LEVEL: 2
PAINLEVEL_OUTOF10: 8
PAINLEVEL_OUTOF10: 4
PAINLEVEL_OUTOF10: 4
PAINLEVEL_OUTOF10: 0 - NO PAIN
PAINLEVEL_OUTOF10: 3
PAINLEVEL_OUTOF10: 4
PAINLEVEL_OUTOF10: 4

## 2024-04-16 ASSESSMENT — COGNITIVE AND FUNCTIONAL STATUS - GENERAL
DAILY ACTIVITIY SCORE: 24
CLIMB 3 TO 5 STEPS WITH RAILING: A LOT
PATIENT BASELINE BEDBOUND: NO
MOBILITY SCORE: 19
MOVING TO AND FROM BED TO CHAIR: A LITTLE
MOBILITY SCORE: 24
STANDING UP FROM CHAIR USING ARMS: A LITTLE
DAILY ACTIVITIY SCORE: 24
WALKING IN HOSPITAL ROOM: A LITTLE

## 2024-04-16 ASSESSMENT — PATIENT HEALTH QUESTIONNAIRE - PHQ9
1. LITTLE INTEREST OR PLEASURE IN DOING THINGS: NOT AT ALL
SUM OF ALL RESPONSES TO PHQ9 QUESTIONS 1 & 2: 0
2. FEELING DOWN, DEPRESSED OR HOPELESS: NOT AT ALL

## 2024-04-16 ASSESSMENT — LIFESTYLE VARIABLES
SKIP TO QUESTIONS 9-10: 1
HOW OFTEN DO YOU HAVE 6 OR MORE DRINKS ON ONE OCCASION: NEVER
HOW OFTEN DO YOU HAVE A DRINK CONTAINING ALCOHOL: 2-4 TIMES A MONTH
PRESCIPTION_ABUSE_PAST_12_MONTHS: NO
SUBSTANCE_ABUSE_PAST_12_MONTHS: NO
AUDIT-C TOTAL SCORE: 2
HOW MANY STANDARD DRINKS CONTAINING ALCOHOL DO YOU HAVE ON A TYPICAL DAY: 1 OR 2
AUDIT-C TOTAL SCORE: 2

## 2024-04-16 ASSESSMENT — ACTIVITIES OF DAILY LIVING (ADL)
ADEQUATE_TO_COMPLETE_ADL: YES
JUDGMENT_ADEQUATE_SAFELY_COMPLETE_DAILY_ACTIVITIES: YES
FEEDING YOURSELF: INDEPENDENT
TOILETING: INDEPENDENT
WALKS IN HOME: INDEPENDENT
ASSISTIVE_DEVICE: CANE;EYEGLASSES
DRESSING YOURSELF: INDEPENDENT
BATHING: INDEPENDENT
LACK_OF_TRANSPORTATION: NO
PATIENT'S MEMORY ADEQUATE TO SAFELY COMPLETE DAILY ACTIVITIES?: YES
HEARING - LEFT EAR: FUNCTIONAL
GROOMING: INDEPENDENT
HEARING - RIGHT EAR: FUNCTIONAL

## 2024-04-16 ASSESSMENT — PAIN - FUNCTIONAL ASSESSMENT
PAIN_FUNCTIONAL_ASSESSMENT: 0-10

## 2024-04-16 ASSESSMENT — PAIN DESCRIPTION - DESCRIPTORS
DESCRIPTORS: ACHING
DESCRIPTORS: ACHING;SHARP
DESCRIPTORS: ACHING

## 2024-04-16 ASSESSMENT — COLUMBIA-SUICIDE SEVERITY RATING SCALE - C-SSRS
1. IN THE PAST MONTH, HAVE YOU WISHED YOU WERE DEAD OR WISHED YOU COULD GO TO SLEEP AND NOT WAKE UP?: NO
6. HAVE YOU EVER DONE ANYTHING, STARTED TO DO ANYTHING, OR PREPARED TO DO ANYTHING TO END YOUR LIFE?: NO
2. HAVE YOU ACTUALLY HAD ANY THOUGHTS OF KILLING YOURSELF?: NO

## 2024-04-16 ASSESSMENT — PAIN SCALES - WONG BAKER: WONGBAKER_NUMERICALRESPONSE: NO HURT

## 2024-04-16 NOTE — ANESTHESIA POSTPROCEDURE EVALUATION
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, initial encounter (CMS-HCC)      (INFLAMMATORY REACTION RIGHT TOTAL KNEE)    Surgeons: Cordell Eller MD Responsible Provider: Cordell Petres DO    Anesthesia Type: general ASA Status: 3            Anesthesia Type: general    Vitals Value Taken Time   /75 04/16/24 1306   Temp 36 °C (96.8 °F) 04/16/24 1306   Pulse 94 04/16/24 1306   Resp nf 04/16/24 1323   SpO2 98 % 04/16/24 1306       Anesthesia Post Evaluation    Patient location during evaluation: bedside  Patient participation: complete - patient participated  Level of consciousness: awake  Pain score: 2  Pain management: adequate  Airway patency: patent  Two or more strategies used to mitigate risk of obstructive sleep apnea  Cardiovascular status: acceptable  Respiratory status: acceptable  Hydration status: acceptable  Postoperative Nausea and Vomiting: none        There were no known notable events for this encounter.

## 2024-04-16 NOTE — ANESTHESIA PREPROCEDURE EVALUATION
Patient: Mague Hummel    Procedure Information       Date/Time: 04/16/24 1015    Procedure: Synovectomy/Bursectomy Knee (Right: Knee)    Location: [facility] / [facility]    Surgeons: Cordell Eller MD            Relevant Problems   Cardiac   (+) Pure hypercholesterolemia       Clinical information reviewed:    Allergies  Meds               NPO Detail:  NPO/Void Status  Carbohydrate Drink Given Prior to Surgery? : N  Date of Last Liquid: 04/16/24  Time of Last Liquid: 2230  Date of Last Solid: 04/16/24  Time of Last Solid: 2230  Last Intake Type: Clear fluids  Time of Last Void: 0853         Physical Exam    Airway  Mallampati: II  TM distance: >3 FB  Neck ROM: full     Cardiovascular   Rhythm: regular  Rate: normal     Dental - normal exam     Pulmonary   Breath sounds clear to auscultation     Abdominal   Abdomen: soft             Anesthesia Plan    History of general anesthesia?: yes  History of complications of general anesthesia?: no    ASA 3     general     The patient is not a current smoker.    intravenous induction   Postoperative administration of opioids is intended.  Anesthetic plan and risks discussed with patient.    Plan discussed with CRNA, attending and CAA.

## 2024-04-16 NOTE — OP NOTE
Synovectomy/Bursectomy Knee  [address]27 - [place] ARTHRT W/SYNVCT KNE ANT&POST W/POP AREA   Operative Note     Date: 2024  OR Location: [facility]    Name: Mague Hummel : 1960, Age: 63 y.o., MRN: [id number], Sex: female    PRE Diagnosis  Inflammatory reaction right total knee replacement    POST Diagnosis  Same    Procedures  Arthrotomy right total knee with polyethylene exchange, synovectomy anterior and posterior joint capsule.    Surgeons      * Cordell Eller - Primary    Resident/Fellow/Other Assistant:  jimmy Flynn courtney    Procedure Summary  Implants:  Li triathlon X3 size 3 x 10 mm posterior stabilized tibial bearing insert    Anesthesia: General LMA with adductor canal block  ASA: III  Anesthesia Staff:   Anesthesiologist: Cordell Peters DO  CRNA: MARK Sanchez-JOYCE  SRNA: Xiomara Balbuena  Estimated Blood Loss: 75 mL    Specimen: 3 separate tissue cultures for routine aerobic and anaerobic extended incubation 2 weeks.    Staff:   Circulator: Romi Bragg RN; Alexandrea Katz RN; Jennifer Schumacher RN  Scrub Person: Carlos Stack; Jimmy Flynn; Sirena Ndiaye; Alexandrea Matamoros         Drains and/or Catheters:   1 Hemovac    Tourniquet Times:     Total Tourniquet Time Documented:  Thigh (Right) - 60 minutes  Total: Thigh (Right) - 60 minutes             Findings: Preoperative exam revealed a healed incision no erythema.  Range of motion was full extension and 95 degrees.  Benign soft tissues superficially.  There was a moderate-sized bloody joint effusion.  Thickened synovium.  Small osteolytic area of the distal femur at the superior tip of the anterior flange of the femoral component.  This extended 2 mm into the cortex and measured approximately 15 mm wide by 5 mm superior to the distal.  The components all appear to be well-fixed.  There was no purulence noted.  Complete synovectomy anterior and posteriorly was performed.  Polyethylene exchange for exposure purposes.   Knee range of motion was full flexion where thigh and calf impinge posteriorly and full extension with stable axial loading exam.  Patella tracked normally.    Indications: Mague Hummel is an 63 y.o. female who is having surgery for INFLAMMATORY REACTION RIGHT TOTAL KNEE.   Patient had a total knee replacement performed in July 2023 with press-fit Krypton triathlon components.  She did very well initially and had finished her postoperative therapy course.  Then around October or November she developed some intermittent swelling and pain episodes.  This became more persistent.  She was seen several times through her office visits for evaluation.  Aspiration initially revealed no fluid.  Second aspiration did reveal a bloody joint effusion.  She had persistent pain and swelling and difficulty with her range of motion.  She felt she lost significant range of motion and strength and balance over the course of the intervening several months.  Upon further evaluation I discussed with her of the potential for arthrotomy, tissue cultures and debridement.  Risks of this procedure including but not limited to infection DVT pulmonary embolism loosening of components periprosthetic fracture damage ligaments tendons neurovascular structures were discussed.  We also consented her for possible functional spacer revision if there is any loosening of the prosthetic components.  Despite the risks the patient did elect to proceed.    Synovasure aspiration from 03/07/2024 indicating a negative alpha defensin. Culture negative for seven days growth, aerobic and anaerobic. ID panel negative for genetic markers. Red blood cell count was 873,000, white blood cell count was 4,800, and neutrophils were 68%. ESR was 6 on 01/08/2024, CRP was 1.1. Given the constellation of information, her MSIS score is 5 indicating a possibly infected knee.      Procedure Details: Medications: Ancef 2 g IV, tranexamic acid 2 g IV, Decadron 10 mg  IV    Patient was seen and evaluated in the preoperative area.  Right leg marked as the operative extremity.  She was brought back to the operating room and anesthesia to control the head neck airway ministered general anesthetic.  She was supine on the table.  We placed a Sandhu catheter.  I placed a well-padded thigh tourniquet.  A small bump was placed under the operative leg.  I washed her leg with chlorhexidine alcohol prepped with a ChloraPrep and draped in usual manner with extra draping.  Patient leg was elevated no Esmarch was used and the tourniquet was elevated to 250 mmHg.    I marked out a previous incision anteriorly.  This was recreated with the scalpel.  The soft tissues were benign no fluid collections were noted.  I created a full-thickness skin flap medial and laterally.  Identified the previous subvastus arthrotomy with Ethibond suture closure.  I then marked out a medial parapatellar extension.  This was created with a scalpel.  A moderate-sized bloody joint effusion was removed.  I then placed Hohmann retractors medial and laterally.  I began excising the suprapatellar synovium.  I debrided from the joint capsule and tendon anteriorly to the gutters medial and laterally.  I took a portion of tissue for culture.  #1 culture was from the medial distal femoral bone prosthetic interface.  #2 culture was from the lateral gutter area.  I also everted the patella and circumferentially debrided around here.  I then inspected the bone prosthetic interface of the distal femur and patella and these appear to be intact.  There was a patch of developing osteolysis at the superior tip of the anterior flange of the femoral component.  After debridement with curettes and rongeur this area was contained.  Extended 2 mm deep into the cortical surface of the anterior femur.  Measured approximately 15 mm wide by 5 mm tall.  After thorough debridement of the soft tissues and I then flexed the knee.  I placed medial  lateral Hohmann retractors.  I debrided the intercondylar notch and this was tissue culture #3.  I then inspected the distal aspect of the medial and lateral femur and this still appeared to be intact no signs of loosening.  I inspected the bone prosthetic interface of the tibia and this appeared to be well-fixed as well.  I now used a curved osteotome to release the polyethylene lock anteriorly.  I remove the polyethylene and confirmed the previous size of 3 x 9 mm.  I then used a bone hook to elevate the posterior space and debrided the posterior capsule with pituitary rongeur.  I excised a portion of tissue medial and laterally that was overhanging the posterior prosthesis.  I used a curved osteotome to reflect the synovial folds from the distal posterior aspect of the femur.  After this debridement was completed I now irrigated with 3 L normal saline both the anterior and posterior aspects of the knee joint.  After irrigation I allowed Irrisept to settle into the wound for up to 5 minutes.  During this time I debrided the Ethibond sutures retained from the previous exposure.  I now irrigated the soft tissues and knee joint again with 3 L of normal saline.    The extra draping was now removed, new Bovie was opened, the suction tubing ,surgical team changed their gloves    I now flexed the knee and inspected the posterior capsule for debridement.  I placed Hohmann retractors medial laterally.  I then placed a size 10 mm insert trial and was satisfied with the kinematics.  She retained full extension stable to an axial loading exam and good flexion of the knee.  Patella tracked normally.  I now removed the trial component.  I cleaned the locking mechanism.  I now inserted a size 3 x 10 mm insert component.  The fit was secure medial and laterally.  I now extended the knee and irrigated with a diluted Betadine solution.  I allowed it to sit and then washed this out with normal saline through pulse lavage.  Total  irrigation approximately 3 L.      I now packed the knee and drop the tourniquet for hemostasis.  I placed 1 intra-articular drain.  I now closed the arthrotomy with a #1 looped PDS suture.  The knot was retained in the proximal aspect of the quadricep.  I then closed the superficial tissue with running 0 PDS, interrupted 2-0 Monocryl and 4-0 Monocryl on the skin.  Dermabond and Mepilex dressing was applied.  Patient taught procedure well awakened taken to PACU in stable condition no complications encountered.  Complications:  None; patient tolerated the procedure well.    Disposition: PACU - hemodynamically stable.  Condition: stable             Attending Attestation: I performed the procedure.    Cordell Eller  Phone Number: [phone]

## 2024-04-16 NOTE — ANESTHESIA PROCEDURE NOTES
Peripheral Block    Patient location during procedure: pre-op  Start time: 4/16/2024 10:05 AM  End time: 4/16/2024 10:07 AM  Reason for block: at surgeon's request  Staffing  Performed: attending and CRNA   Authorized by: Cordell Peters DO    Performed by: Cordell Peters DO  Preanesthetic Checklist  Completed: patient identified, IV checked, site marked, risks and benefits discussed, surgical consent, monitors and equipment checked, pre-op evaluation and timeout performed   Timeout performed at: 4/16/2024 10:01 AM  Peripheral Block  Patient position: laying flat  Prep: ChloraPrep  Patient monitoring: heart rate, cardiac monitor and continuous pulse ox  Block type: adductor canal  Laterality: right  Injection technique: single-shot  Guidance: ultrasound guided  Local infiltration: ropivacaine  Infiltration strength: 0.5 %  Dose: 22 mL  Needle  Needle type: short-bevel   Needle gauge: 22 G  Needle length: 10 cm  Needle localization: ultrasound guidance     image stored in chart  Needle insertion depth: 5 cm  Assessment  Injection assessment: negative aspiration for heme, no paresthesia on injection, incremental injection and local visualized surrounding nerve on ultrasound  Paresthesia pain: none  Heart rate change: no  Slow fractionated injection: yes

## 2024-04-16 NOTE — NURSING NOTE
Pt A&O x3 arrived to floor from PACU. Mild complaints of nausea noted. Pt orientated to room, call light, and phone. Call light within reach. Pt to be on IV fluids per order. Dsg,hemovac, and cruz in place per order.

## 2024-04-16 NOTE — ANESTHESIA PROCEDURE NOTES
Airway  Date/Time: 4/16/2024 10:56 AM  Urgency: elective    Airway not difficult    Staffing  Performed: EMILIANO   Authorized by: Cordell Peters DO    Performed by: MARK Sanchez-CRNA  Patient location during procedure: OR    Indications and Patient Condition  Indications for airway management: anesthesia  Spontaneous Ventilation: absent  Sedation level: deep  Preoxygenated: yes  Patient position: sniffing  MILS maintained throughout  Mask difficulty assessment: 0 - not attempted    Final Airway Details  Final airway type: supraglottic airway      Successful airway: Size 4     Number of attempts at approach: 1

## 2024-04-16 NOTE — CARE PLAN
The patient's goals for the shift include comfort    The clinical goals for the shift include IV fliuds and pain management

## 2024-04-17 ENCOUNTER — DOCUMENTATION (OUTPATIENT)
Dept: HOME HEALTH SERVICES | Facility: HOME HEALTH | Age: 64
End: 2024-04-17
Payer: MEDICARE

## 2024-04-17 ENCOUNTER — HOME HEALTH ADMISSION (OUTPATIENT)
Dept: HOME HEALTH SERVICES | Facility: HOME HEALTH | Age: 64
End: 2024-04-17
Payer: MEDICARE

## 2024-04-17 ENCOUNTER — PHARMACY VISIT (OUTPATIENT)
Dept: PHARMACY | Facility: CLINIC | Age: 64
End: 2024-04-17
Payer: MEDICARE

## 2024-04-17 VITALS
BODY MASS INDEX: 31.05 KG/M2 | WEIGHT: 181.88 LBS | HEART RATE: 92 BPM | TEMPERATURE: 98.1 F | DIASTOLIC BLOOD PRESSURE: 66 MMHG | RESPIRATION RATE: 17 BRPM | OXYGEN SATURATION: 97 % | HEIGHT: 64 IN | SYSTOLIC BLOOD PRESSURE: 167 MMHG

## 2024-04-17 LAB
ANION GAP SERPL CALC-SCNC: 13 MMOL/L
BUN SERPL-MCNC: 11 MG/DL (ref 8–25)
CALCIUM SERPL-MCNC: 9.1 MG/DL (ref 8.5–10.4)
CHLORIDE SERPL-SCNC: 102 MMOL/L (ref 97–107)
CO2 SERPL-SCNC: 24 MMOL/L (ref 24–31)
CREAT SERPL-MCNC: 0.6 MG/DL (ref 0.4–1.6)
EGFRCR SERPLBLD CKD-EPI 2021: >90 ML/MIN/1.73M*2
ERYTHROCYTE [DISTWIDTH] IN BLOOD BY AUTOMATED COUNT: 12.8 % (ref 11.5–14.5)
GLUCOSE SERPL-MCNC: 118 MG/DL (ref 65–99)
HCT VFR BLD AUTO: 31.6 % (ref 36–46)
HGB BLD-MCNC: 10.3 G/DL (ref 12–16)
IRON SATN MFR SERPL: 11 % (ref 12–50)
IRON SERPL-MCNC: 34 UG/DL (ref 30–160)
MCH RBC QN AUTO: 31.5 PG (ref 26–34)
MCHC RBC AUTO-ENTMCNC: 32.6 G/DL (ref 32–36)
MCV RBC AUTO: 97 FL (ref 80–100)
NRBC BLD-RTO: 0 /100 WBCS (ref 0–0)
PLATELET # BLD AUTO: 296 X10*3/UL (ref 150–450)
POTASSIUM SERPL-SCNC: 4 MMOL/L (ref 3.4–5.1)
RBC # BLD AUTO: 3.27 X10*6/UL (ref 4–5.2)
SODIUM SERPL-SCNC: 139 MMOL/L (ref 133–145)
TIBC SERPL-MCNC: 302 UG/DL (ref 228–428)
UIBC SERPL-MCNC: 268 UG/DL (ref 110–370)
WBC # BLD AUTO: 15.1 X10*3/UL (ref 4.4–11.3)

## 2024-04-17 PROCEDURE — 97161 PT EVAL LOW COMPLEX 20 MIN: CPT | Mod: GP | Performed by: PHYSICAL THERAPIST

## 2024-04-17 PROCEDURE — 96375 TX/PRO/DX INJ NEW DRUG ADDON: CPT

## 2024-04-17 PROCEDURE — 2500000001 HC RX 250 WO HCPCS SELF ADMINISTERED DRUGS (ALT 637 FOR MEDICARE OP): Performed by: ORTHOPAEDIC SURGERY

## 2024-04-17 PROCEDURE — 83540 ASSAY OF IRON: CPT | Performed by: NURSE PRACTITIONER

## 2024-04-17 PROCEDURE — 36415 COLL VENOUS BLD VENIPUNCTURE: CPT | Performed by: ORTHOPAEDIC SURGERY

## 2024-04-17 PROCEDURE — 96361 HYDRATE IV INFUSION ADD-ON: CPT

## 2024-04-17 PROCEDURE — 2500000004 HC RX 250 GENERAL PHARMACY W/ HCPCS (ALT 636 FOR OP/ED): Performed by: ORTHOPAEDIC SURGERY

## 2024-04-17 PROCEDURE — 97166 OT EVAL MOD COMPLEX 45 MIN: CPT | Mod: GO

## 2024-04-17 PROCEDURE — 2500000004 HC RX 250 GENERAL PHARMACY W/ HCPCS (ALT 636 FOR OP/ED): Performed by: NURSE PRACTITIONER

## 2024-04-17 PROCEDURE — 97530 THERAPEUTIC ACTIVITIES: CPT | Mod: GO

## 2024-04-17 PROCEDURE — G0378 HOSPITAL OBSERVATION PER HR: HCPCS

## 2024-04-17 PROCEDURE — 85027 COMPLETE CBC AUTOMATED: CPT | Performed by: ORTHOPAEDIC SURGERY

## 2024-04-17 PROCEDURE — RXMED WILLOW AMBULATORY MEDICATION CHARGE

## 2024-04-17 PROCEDURE — 99222 1ST HOSP IP/OBS MODERATE 55: CPT | Performed by: NURSE PRACTITIONER

## 2024-04-17 PROCEDURE — 2500000006 HC RX 250 W HCPCS SELF ADMINISTERED DRUGS (ALT 637 FOR ALL PAYERS): Mod: MUE | Performed by: ORTHOPAEDIC SURGERY

## 2024-04-17 PROCEDURE — 97530 THERAPEUTIC ACTIVITIES: CPT | Mod: GP | Performed by: PHYSICAL THERAPIST

## 2024-04-17 PROCEDURE — 80048 BASIC METABOLIC PNL TOTAL CA: CPT | Performed by: ORTHOPAEDIC SURGERY

## 2024-04-17 RX ORDER — ACETAMINOPHEN 500 MG
1000 TABLET ORAL EVERY 6 HOURS PRN
Qty: 120 TABLET | Refills: 0 | Status: SHIPPED | OUTPATIENT
Start: 2024-04-17 | End: 2024-05-02

## 2024-04-17 RX ORDER — OXYCODONE HYDROCHLORIDE 5 MG/1
5 TABLET ORAL EVERY 4 HOURS PRN
Qty: 40 TABLET | Refills: 0 | Status: SHIPPED | OUTPATIENT
Start: 2024-04-17 | End: 2024-04-24

## 2024-04-17 RX ORDER — IBUPROFEN 600 MG/1
600 TABLET ORAL EVERY 6 HOURS PRN
Qty: 20 TABLET | Refills: 0 | Status: SHIPPED | OUTPATIENT
Start: 2024-04-17 | End: 2024-04-22

## 2024-04-17 RX ORDER — ASPIRIN 81 MG/1
81 TABLET ORAL 2 TIMES DAILY
Qty: 56 TABLET | Refills: 0 | Status: SHIPPED | OUTPATIENT
Start: 2024-04-17 | End: 2024-05-15

## 2024-04-17 RX ORDER — DOCUSATE SODIUM 100 MG/1
100 CAPSULE, LIQUID FILLED ORAL 2 TIMES DAILY
Qty: 30 CAPSULE | Refills: 0 | Status: SHIPPED | OUTPATIENT
Start: 2024-04-17 | End: 2024-05-02

## 2024-04-17 RX ADMIN — ACETAMINOPHEN 1000 MG: 500 TABLET ORAL at 13:25

## 2024-04-17 RX ADMIN — PANTOPRAZOLE SODIUM 20 MG: 20 TABLET, DELAYED RELEASE ORAL at 08:58

## 2024-04-17 RX ADMIN — ASPIRIN 81 MG: 81 TABLET, COATED ORAL at 08:58

## 2024-04-17 RX ADMIN — OXYCODONE 10 MG: 5 TABLET ORAL at 13:25

## 2024-04-17 RX ADMIN — ACETAMINOPHEN 1000 MG: 500 TABLET ORAL at 00:20

## 2024-04-17 RX ADMIN — OXYCODONE 10 MG: 5 TABLET ORAL at 08:58

## 2024-04-17 RX ADMIN — ACETAMINOPHEN 1000 MG: 500 TABLET ORAL at 05:46

## 2024-04-17 RX ADMIN — IRON SUCROSE 200 MG: 20 INJECTION, SOLUTION INTRAVENOUS at 08:57

## 2024-04-17 RX ADMIN — MULTIVITAMIN TABLET 1 TABLET: TABLET at 08:58

## 2024-04-17 RX ADMIN — POLYETHYLENE GLYCOL 3350 17 G: 17 POWDER, FOR SOLUTION ORAL at 08:58

## 2024-04-17 RX ADMIN — CEFAZOLIN SODIUM 2 G: 2 INJECTION, SOLUTION INTRAVENOUS at 04:00

## 2024-04-17 RX ADMIN — LORATADINE 10 MG: 10 TABLET ORAL at 08:58

## 2024-04-17 RX ADMIN — DOCUSATE SODIUM 100 MG: 100 CAPSULE, LIQUID FILLED ORAL at 08:57

## 2024-04-17 RX ADMIN — OXYCODONE 5 MG: 5 TABLET ORAL at 04:08

## 2024-04-17 RX ADMIN — SODIUM CHLORIDE, SODIUM LACTATE, POTASSIUM CHLORIDE, AND CALCIUM CHLORIDE 125 ML/HR: 600; 310; 30; 20 INJECTION, SOLUTION INTRAVENOUS at 04:00

## 2024-04-17 SDOH — ECONOMIC STABILITY: HOUSING INSECURITY: IN THE LAST 12 MONTHS, WAS THERE A TIME WHEN YOU WERE NOT ABLE TO PAY THE MORTGAGE OR RENT ON TIME?: NO

## 2024-04-17 SDOH — ECONOMIC STABILITY: TRANSPORTATION INSECURITY
IN THE PAST 12 MONTHS, HAS THE LACK OF TRANSPORTATION KEPT YOU FROM MEDICAL APPOINTMENTS OR FROM GETTING MEDICATIONS?: NO

## 2024-04-17 SDOH — ECONOMIC STABILITY: FOOD INSECURITY: WITHIN THE PAST 12 MONTHS, THE FOOD YOU BOUGHT JUST DIDN'T LAST AND YOU DIDN'T HAVE MONEY TO GET MORE.: NEVER TRUE

## 2024-04-17 SDOH — HEALTH STABILITY: PHYSICAL HEALTH: ON AVERAGE, HOW MANY MINUTES DO YOU ENGAGE IN EXERCISE AT THIS LEVEL?: 30 MIN

## 2024-04-17 SDOH — HEALTH STABILITY: MENTAL HEALTH: HOW OFTEN DO YOU HAVE 6 OR MORE DRINKS ON ONE OCCASION?: NEVER

## 2024-04-17 SDOH — SOCIAL STABILITY: SOCIAL NETWORK
IN A TYPICAL WEEK, HOW MANY TIMES DO YOU TALK ON THE PHONE WITH FAMILY, FRIENDS, OR NEIGHBORS?: MORE THAN THREE TIMES A WEEK

## 2024-04-17 SDOH — SOCIAL STABILITY: SOCIAL INSECURITY: WITHIN THE LAST YEAR, HAVE YOU BEEN HUMILIATED OR EMOTIONALLY ABUSED IN OTHER WAYS BY YOUR PARTNER OR EX-PARTNER?: NO

## 2024-04-17 SDOH — SOCIAL STABILITY: SOCIAL INSECURITY
WITHIN THE LAST YEAR, HAVE YOU BEEN RAPED OR FORCED TO HAVE ANY KIND OF SEXUAL ACTIVITY BY YOUR PARTNER OR EX-PARTNER?: NO

## 2024-04-17 SDOH — HEALTH STABILITY: MENTAL HEALTH: HOW OFTEN DO YOU HAVE SIX OR MORE DRINKS ON ONE OCCASION?: NEVER

## 2024-04-17 SDOH — ECONOMIC STABILITY: HOUSING INSECURITY
IN THE LAST 12 MONTHS, WAS THERE A TIME WHEN YOU DID NOT HAVE A STEADY PLACE TO SLEEP OR SLEPT IN A SHELTER (INCLUDING NOW)?: NO

## 2024-04-17 SDOH — HEALTH STABILITY: MENTAL HEALTH: HOW OFTEN DO YOU HAVE A DRINK CONTAINING ALCOHOL?: 2-4 TIMES A MONTH

## 2024-04-17 SDOH — ECONOMIC STABILITY: FOOD INSECURITY: WITHIN THE PAST 12 MONTHS, YOU WORRIED THAT YOUR FOOD WOULD RUN OUT BEFORE YOU GOT MONEY TO BUY MORE.: NEVER TRUE

## 2024-04-17 SDOH — HEALTH STABILITY: MENTAL HEALTH: HOW MANY STANDARD DRINKS CONTAINING ALCOHOL DO YOU HAVE ON A TYPICAL DAY?: 1 OR 2

## 2024-04-17 SDOH — ECONOMIC STABILITY: INCOME INSECURITY: IN THE PAST 12 MONTHS HAS THE ELECTRIC, GAS, OIL, OR WATER COMPANY THREATENED TO SHUT OFF SERVICES IN YOUR HOME?: NO

## 2024-04-17 SDOH — SOCIAL STABILITY: SOCIAL INSECURITY: WITHIN THE LAST YEAR, HAVE YOU BEEN AFRAID OF YOUR PARTNER OR EX-PARTNER?: NO

## 2024-04-17 SDOH — ECONOMIC STABILITY: HOUSING INSECURITY: IN THE LAST 12 MONTHS, HOW MANY PLACES HAVE YOU LIVED?: 1

## 2024-04-17 SDOH — SOCIAL STABILITY: SOCIAL INSECURITY
WITHIN THE LAST YEAR, HAVE YOU BEEN KICKED, HIT, SLAPPED, OR OTHERWISE PHYSICALLY HURT BY YOUR PARTNER OR EX-PARTNER?: NO

## 2024-04-17 SDOH — HEALTH STABILITY: MENTAL HEALTH: HOW MANY DRINKS CONTAINING ALCOHOL DO YOU HAVE ON A TYPICAL DAY WHEN YOU ARE DRINKING?: 1 OR 2

## 2024-04-17 SDOH — ECONOMIC STABILITY: FOOD INSECURITY: HOW HARD IS IT FOR YOU TO PAY FOR THE VERY BASICS LIKE FOOD, HOUSING, MEDICAL CARE, AND HEATING?: NOT HARD AT ALL

## 2024-04-17 SDOH — ECONOMIC STABILITY: TRANSPORTATION INSECURITY: IN THE PAST 12 MONTHS, HAS LACK OF TRANSPORTATION KEPT YOU FROM MEDICAL APPOINTMENTS OR FROM GETTING MEDICATIONS?: NO

## 2024-04-17 SDOH — SOCIAL STABILITY: SOCIAL NETWORK: HOW OFTEN DO YOU GET TOGETHER WITH FRIENDS OR RELATIVES?: MORE THAN THREE TIMES A WEEK

## 2024-04-17 SDOH — SOCIAL STABILITY: SOCIAL INSECURITY
WITHIN THE LAST YEAR, HAVE TO BEEN RAPED OR FORCED TO HAVE ANY KIND OF SEXUAL ACTIVITY BY YOUR PARTNER OR EX-PARTNER?: NO

## 2024-04-17 SDOH — ECONOMIC STABILITY: INCOME INSECURITY: HOW HARD IS IT FOR YOU TO PAY FOR THE VERY BASICS LIKE FOOD, HOUSING, MEDICAL CARE, AND HEATING?: NOT HARD AT ALL

## 2024-04-17 SDOH — SOCIAL STABILITY: SOCIAL NETWORK: ARE YOU MARRIED, WIDOWED, DIVORCED, SEPARATED, NEVER MARRIED, OR LIVING WITH A PARTNER?: MARRIED

## 2024-04-17 SDOH — ECONOMIC STABILITY: INCOME INSECURITY: IN THE LAST 12 MONTHS, WAS THERE A TIME WHEN YOU WERE NOT ABLE TO PAY THE MORTGAGE OR RENT ON TIME?: NO

## 2024-04-17 SDOH — HEALTH STABILITY: PHYSICAL HEALTH: ON AVERAGE, HOW MANY DAYS PER WEEK DO YOU ENGAGE IN MODERATE TO STRENUOUS EXERCISE (LIKE A BRISK WALK)?: 4 DAYS

## 2024-04-17 SDOH — ECONOMIC STABILITY: FOOD INSECURITY: WITHIN THE PAST 12 MONTHS, YOU WORRIED THAT YOUR FOOD WOULD RUN OUT BEFORE YOU GOT THE MONEY TO BUY MORE.: NEVER TRUE

## 2024-04-17 SDOH — ECONOMIC STABILITY: INCOME INSECURITY: IN THE PAST 12 MONTHS, HAS THE ELECTRIC, GAS, OIL, OR WATER COMPANY THREATENED TO SHUT OFF SERVICE IN YOUR HOME?: NO

## 2024-04-17 SDOH — SOCIAL STABILITY: SOCIAL INSECURITY: ARE YOU MARRIED, WIDOWED, DIVORCED, SEPARATED, NEVER MARRIED, OR LIVING WITH A PARTNER?: MARRIED

## 2024-04-17 SDOH — ECONOMIC STABILITY: TRANSPORTATION INSECURITY
IN THE PAST 12 MONTHS, HAS LACK OF TRANSPORTATION KEPT YOU FROM MEETINGS, WORK, OR FROM GETTING THINGS NEEDED FOR DAILY LIVING?: NO

## 2024-04-17 ASSESSMENT — ENCOUNTER SYMPTOMS
FATIGUE: 0
CONSTIPATION: 0
SEIZURES: 0
ACTIVITY CHANGE: 1
APPETITE CHANGE: 0
AGITATION: 0
VOMITING: 0
WOUND: 0
NUMBNESS: 0
CONFUSION: 0
WEAKNESS: 0
HEMATURIA: 0
DIZZINESS: 0
CHILLS: 0
SHORTNESS OF BREATH: 0
DIARRHEA: 0
COUGH: 0
HEADACHES: 0
PALPITATIONS: 0
DYSURIA: 0
NAUSEA: 0
JOINT SWELLING: 1
APNEA: 0
ARTHRALGIAS: 1
WHEEZING: 0
BLOOD IN STOOL: 0
COLOR CHANGE: 0
LIGHT-HEADEDNESS: 0
FEVER: 0

## 2024-04-17 ASSESSMENT — PAIN - FUNCTIONAL ASSESSMENT
PAIN_FUNCTIONAL_ASSESSMENT: 0-10

## 2024-04-17 ASSESSMENT — COGNITIVE AND FUNCTIONAL STATUS - GENERAL
CLIMB 3 TO 5 STEPS WITH RAILING: A LITTLE
MOVING TO AND FROM BED TO CHAIR: A LITTLE
TOILETING: A LITTLE
MOBILITY SCORE: 18
EATING MEALS: A LITTLE
CLIMB 3 TO 5 STEPS WITH RAILING: A LOT
STANDING UP FROM CHAIR USING ARMS: A LITTLE
DAILY ACTIVITIY SCORE: 24
DRESSING REGULAR UPPER BODY CLOTHING: A LITTLE
WALKING IN HOSPITAL ROOM: A LITTLE
STANDING UP FROM CHAIR USING ARMS: A LITTLE
MOVING TO AND FROM BED TO CHAIR: A LITTLE
MOBILITY SCORE: 19
DRESSING REGULAR LOWER BODY CLOTHING: TOTAL
WALKING IN HOSPITAL ROOM: A LITTLE
TURNING FROM BACK TO SIDE WHILE IN FLAT BAD: A LITTLE
PERSONAL GROOMING: A LITTLE
HELP NEEDED FOR BATHING: A LITTLE
MOVING FROM LYING ON BACK TO SITTING ON SIDE OF FLAT BED WITH BEDRAILS: A LITTLE
DAILY ACTIVITIY SCORE: 16

## 2024-04-17 ASSESSMENT — ACTIVITIES OF DAILY LIVING (ADL)
ADL_ASSISTANCE: INDEPENDENT
ADL_ASSISTANCE: INDEPENDENT
LACK_OF_TRANSPORTATION: NO
BATHING_ASSISTANCE: NOT PERFORMED

## 2024-04-17 ASSESSMENT — PAIN SCALES - GENERAL
PAINLEVEL_OUTOF10: 7
PAINLEVEL_OUTOF10: 2
PAINLEVEL_OUTOF10: 7
PAINLEVEL_OUTOF10: 4
PAINLEVEL_OUTOF10: 3
PAINLEVEL_OUTOF10: 2
PAINLEVEL_OUTOF10: 4
PAINLEVEL_OUTOF10: 3

## 2024-04-17 ASSESSMENT — PAIN DESCRIPTION - LOCATION
LOCATION: KNEE

## 2024-04-17 ASSESSMENT — PAIN DESCRIPTION - ORIENTATION
ORIENTATION: RIGHT

## 2024-04-17 ASSESSMENT — LIFESTYLE VARIABLES
SKIP TO QUESTIONS 9-10: 1
AUDIT-C TOTAL SCORE: 2

## 2024-04-17 NOTE — CONSULTS
Consults-blood management    Reason For Consult  Post op anemia    History Of Present Illness  Mague Hummel is a 63 y.o. female presenting with right knee pain and swelling s/p right TKA 2023. She is 1 day s/p arthrotomy and synovectomy right total knee with polyethylene exchange secondary to inflammatory reaction right TKA. EBL 75ml. Hemovac drainage 120ml. Hemovac patent continue draining serosanguinous fluid. Pt reports she had right TKA 2023, did well for 3-4 months, started having intermittent mild swelling of knee, in December swelling and pain more pronounced and steadily increased by end February difficulty to walk, pt reports initial aspiration of knee no fluid, second time aspirated bloody fluid, surgery recommended. Preop H/H 12.2/37.4, post op H/H 10.3/31.6, .        Past Medical History  She has a past medical history of Allergic (), Arthritis (), Breast cyst (), Cataract (), Fibrocystic breast (), GERD (gastroesophageal reflux disease) (), and Hyperlipidemia.    Surgical History  She has a past surgical history that includes Breast cyst excision (); Adenoidectomy (); Cholecystectomy ();  section, low transverse (); Endometrial ablation (); Tonsillectomy (); [place] tooth extraction (); Total knee arthroplasty (Right, 2023); and Cataract extraction w/ intraocular lens  implant, bilateral.     Social History  She reports that she has never smoked. She has never been exposed to tobacco smoke. She has never used smokeless tobacco. She reports current alcohol use of about 1.0 standard drink of alcohol per week. She reports that she does not use drugs.    Family History  Family History   Problem Relation Name Age of Onset    Breast cancer Mother Jolynnsteve Valenzuela 68    Colon cancer Mother Jolynn Valenzuela     Arthritis Mother Jolynn Valenzuela     Hypertension Mother Jolynn Valenzuela     Breast cancer Maternal Grandmother Radha Soto     COPD Father  Marquise Valenzuela     Diabetes Father Marquise Valenzuela         Allergies  Penicillins, Sulfa (sulfonamide antibiotics), and Tetracycline    Review of Systems   Constitutional:  Positive for activity change. Negative for appetite change, chills, fatigue and fever.   HENT:  Negative for congestion and nosebleeds.    Eyes:  Negative for visual disturbance.   Respiratory:  Negative for apnea, cough, shortness of breath and wheezing.    Cardiovascular:  Negative for chest pain, palpitations and leg swelling.   Gastrointestinal:  Negative for blood in stool, constipation, diarrhea, nausea and vomiting.   Endocrine: Negative for cold intolerance and heat intolerance.   Genitourinary:  Negative for dysuria and hematuria.   Musculoskeletal:  Positive for arthralgias and joint swelling.   Skin:  Negative for color change, pallor, rash and wound.   Neurological:  Negative for dizziness, seizures, weakness, light-headedness, numbness and headaches.   Psychiatric/Behavioral:  Negative for agitation and confusion.         Physical Exam  Constitutional:       Appearance: Normal appearance.   HENT:      Head: Normocephalic and atraumatic.      Right Ear: External ear normal.      Left Ear: External ear normal.      Nose: Nose normal.      Mouth/Throat:      Mouth: Mucous membranes are moist.      Pharynx: Oropharynx is clear.   Eyes:      Conjunctiva/sclera: Conjunctivae normal.      Pupils: Pupils are equal, round, and reactive to light.   Cardiovascular:      Rate and Rhythm: Normal rate and regular rhythm.      Heart sounds: Normal heart sounds. No murmur heard.  Pulmonary:      Effort: Pulmonary effort is normal. No respiratory distress.      Breath sounds: Normal breath sounds. No wheezing, rhonchi or rales.   Chest:      Chest wall: No tenderness.   Abdominal:      General: Bowel sounds are normal. There is no distension.      Palpations: Abdomen is soft.      Tenderness: There is no abdominal tenderness.   Musculoskeletal:          "General: Tenderness present.      Cervical back: Normal range of motion and neck supple.      Right lower leg: No edema.      Left lower leg: No edema.   Skin:     General: Skin is warm and dry.      Capillary Refill: Capillary refill takes 2 to 3 seconds.      Coloration: Skin is not pale.      Findings: No bruising, erythema, lesion or rash.      Comments: Ace wrap right leg dry/intact, hemovac patent drain serosanguinous fluid.   Neurological:      General: No focal deficit present.      Mental Status: She is alert and oriented to person, place, and time. Mental status is at baseline.   Psychiatric:         Mood and Affect: Mood normal.         Behavior: Behavior normal.          Last Recorded Vitals  Blood pressure 152/71, pulse 99, temperature 36.7 °C (98.1 °F), temperature source Oral, resp. rate 17, height 1.626 m (5' 4.02\"), weight 82.5 kg (181 lb 14.1 oz), SpO2 95%.    Relevant Results  Results for orders placed or performed during the hospital encounter of 04/16/24 (from the past 24 hour(s))   Tissue/Wound Culture/Smear    Specimen: SOFT TISSUE RESECTION   Result Value Ref Range    Gram Stain No polymorphonuclear leukocytes seen     Gram Stain No organisms seen    Tissue/Wound Culture/Smear    Specimen: SOFT TISSUE RESECTION   Result Value Ref Range    Gram Stain No polymorphonuclear leukocytes seen     Gram Stain No organisms seen    Tissue/Wound Culture/Smear    Specimen: SOFT TISSUE RESECTION   Result Value Ref Range    Gram Stain (2+) Few Polymorphonuclear leukocytes     Gram Stain No organisms seen    CBC   Result Value Ref Range    WBC 15.1 (H) 4.4 - 11.3 x10*3/uL    nRBC 0.0 0.0 - 0.0 /100 WBCs    RBC 3.27 (L) 4.00 - 5.20 x10*6/uL    Hemoglobin 10.3 (L) 12.0 - 16.0 g/dL    Hematocrit 31.6 (L) 36.0 - 46.0 %    MCV 97 80 - 100 fL    MCH 31.5 26.0 - 34.0 pg    MCHC 32.6 32.0 - 36.0 g/dL    RDW 12.8 11.5 - 14.5 %    Platelets 296 150 - 450 x10*3/uL   Basic metabolic panel   Result Value Ref Range    " Glucose 118 (H) 65 - 99 mg/dL    Sodium 139 133 - 145 mmol/L    Potassium 4.0 3.4 - 5.1 mmol/L    Chloride 102 97 - 107 mmol/L    Bicarbonate 24 24 - 31 mmol/L    Urea Nitrogen 11 8 - 25 mg/dL    Creatinine 0.60 0.40 - 1.60 mg/dL    eGFR >90 >60 mL/min/1.73m*2    Calcium 9.1 8.5 - 10.4 mg/dL    Anion Gap 13 <=19 mmol/L      Current Facility-Administered Medications:     acetaminophen (Tylenol) tablet 1,000 mg, 1,000 mg, oral, q6h, Cordell Eller MD, 1,000 mg at 04/17/24 0546    aspirin EC tablet 81 mg, 81 mg, oral, BID, Cordell Eller MD, 81 mg at 04/17/24 0858    atorvastatin (Lipitor) tablet 10 mg, 10 mg, oral, Daily, Cordell Eller MD, 10 mg at 04/16/24 2011    docusate sodium (Colace) capsule 100 mg, 100 mg, oral, BID, Cordell Eller MD, 100 mg at 04/17/24 0857    ketorolac (Toradol) injection 15 mg, 15 mg, intravenous, q6h PRN, Cordell Eller MD    lactated Ringer's infusion, 125 mL/hr, intravenous, Continuous, Cordell Eller MD, Last Rate: 125 mL/hr at 04/17/24 0400, 125 mL/hr at 04/17/24 0400    loratadine (Claritin) tablet 10 mg, 10 mg, oral, Daily, Cordell Eller MD, 10 mg at 04/17/24 0858    morphine injection 2 mg, 2 mg, intravenous, q2h PRN, Cordell Eller MD    multivitamin 1 tablet, 1 tablet, oral, Daily, Cordell Eller MD, 1 tablet at 04/17/24 0858    ondansetron (Zofran) injection 4 mg, 4 mg, intravenous, q8h PRN, Cordell Eller MD, 4 mg at 04/16/24 1806    oxyCODONE (Roxicodone) immediate release tablet 10 mg, 10 mg, oral, q4h PRN, Cordell Eller MD, 10 mg at 04/17/24 0858    oxyCODONE (Roxicodone) immediate release tablet 5 mg, 5 mg, oral, q4h PRN, Cordell Eller MD, 5 mg at 04/17/24 0408    oxyCODONE ER (OxyCONTIN) 12 hr tablet 10 mg, 10 mg, oral, Once PRN, Cordell Peters,     oxygen (O2) therapy, 2 L/min, inhalation, Continuous, Cordell Eller MD    pantoprazole (ProtoNix) EC tablet 20 mg, 20 mg, oral, Daily, Cordell Eller MD, 20 mg at 04/17/24 0858    polyethylene glycol  (Glycolax, Miralax) packet 17 g, 17 g, oral, Daily, Cordell Eller MD, 17 g at 04/17/24 0801      Assessment/Plan   Inflammatory reaction right TKA  S/p arthrotomy and synovectomy right total knee with polyethylene exchange   Post op normocytic anemia  Anemia of acute blood loss combined with anemia of inflammation    Venofer 200mg IVX1  Monitor H/H

## 2024-04-17 NOTE — CARE PLAN
Problem: Skin  Goal: Decreased wound size/increased tissue granulation at next dressing change  Outcome: Progressing  Goal: Participates in plan/prevention/treatment measures  Outcome: Progressing  Goal: Prevent/manage excess moisture  Outcome: Progressing  Goal: Prevent/minimize sheer/friction injuries  Outcome: Progressing  Goal: Promote/optimize nutrition  Outcome: Progressing  Goal: Promote skin healing  Outcome: Progressing     Problem: Fall/Injury  Goal: Not fall by end of shift  Outcome: Progressing  Goal: Be free from injury by end of the shift  Outcome: Progressing  Goal: Verbalize understanding of personal risk factors for fall in the hospital  Outcome: Progressing  Goal: Verbalize understanding of risk factor reduction measures to prevent injury from fall in the home  Outcome: Progressing  Goal: Use assistive devices by end of the shift  Outcome: Progressing  Goal: Pace activities to prevent fatigue by end of the shift  Outcome: Progressing     Problem: Pain  Goal: Takes deep breaths with improved pain control throughout the shift  Outcome: Progressing  Goal: Turns in bed with improved pain control throughout the shift  Outcome: Progressing  Goal: Walks with improved pain control throughout the shift  Outcome: Progressing  Goal: Performs ADL's with improved pain control throughout shift  Outcome: Progressing  Goal: Participates in PT with improved pain control throughout the shift  Outcome: Progressing  Goal: Free from opioid side effects throughout the shift  Outcome: Progressing  Goal: Free from acute confusion related to pain meds throughout the shift  Outcome: Progressing     Problem: Pain  Goal: My pain/discomfort is manageable  Outcome: Progressing     Problem: Daily Care  Goal: Daily care needs are met  Outcome: Progressing     Problem: Psychosocial Needs  Goal: Demonstrates ability to cope with hospitalization/illness  Outcome: Progressing  Goal: Collaborate with me, my family, and caregiver to  identify my specific goals  Outcome: Progressing     Problem: Discharge Barriers  Goal: My discharge needs are met  Outcome: Progressing   The patient's goals for the shift include comfort    The clinical goals for the shift include pain management    Over the shift, the patient did not make progress toward the following goals. Barriers to progression include weakness, pain. Recommendations to address these barriers include safe ambulation, pain control.

## 2024-04-17 NOTE — CARE PLAN
Problem: Skin  Goal: Decreased wound size/increased tissue granulation at next dressing change  4/17/2024 1236 by Celia James RN  Outcome: Adequate for Discharge  4/17/2024 1236 by Celia James RN  Outcome: Progressing  Goal: Participates in plan/prevention/treatment measures  4/17/2024 1236 by Celia James RN  Outcome: Adequate for Discharge  4/17/2024 1236 by Celia James RN  Outcome: Progressing  Goal: Prevent/manage excess moisture  4/17/2024 1236 by Celia James RN  Outcome: Adequate for Discharge  4/17/2024 1236 by Celia James RN  Outcome: Progressing  Goal: Prevent/minimize sheer/friction injuries  4/17/2024 1236 by Celia James RN  Outcome: Adequate for Discharge  4/17/2024 1236 by Ceila James RN  Outcome: Progressing  Goal: Promote/optimize nutrition  4/17/2024 1236 by Celia James RN  Outcome: Adequate for Discharge  4/17/2024 1236 by Celia James RN  Outcome: Progressing  Goal: Promote skin healing  4/17/2024 1236 by Celia James RN  Outcome: Adequate for Discharge  4/17/2024 1236 by Celia James RN  Outcome: Progressing     Problem: Fall/Injury  Goal: Not fall by end of shift  4/17/2024 1236 by Celia James, BRYON  Outcome: Adequate for Discharge  4/17/2024 1236 by Celia James RN  Outcome: Progressing  Goal: Be free from injury by end of the shift  4/17/2024 1236 by Celia James RN  Outcome: Adequate for Discharge  4/17/2024 1236 by Celia James RN  Outcome: Progressing  Goal: Verbalize understanding of personal risk factors for fall in the hospital  4/17/2024 1236 by Celia James RN  Outcome: Adequate for Discharge  4/17/2024 1236 by Celia James RN  Outcome: Progressing  Goal: Verbalize understanding of risk factor reduction measures to prevent injury from fall in the home  4/17/2024 1236 by Celia James RN  Outcome: Adequate for Discharge  4/17/2024 1236 by Celia James RN  Outcome: Progressing  Goal: Use assistive devices by end of the shift  4/17/2024 1236  by Celia James RN  Outcome: Adequate for Discharge  4/17/2024 1236 by Celia James RN  Outcome: Progressing  Goal: Pace activities to prevent fatigue by end of the shift  4/17/2024 1236 by Celia James RN  Outcome: Adequate for Discharge  4/17/2024 1236 by Celia James RN  Outcome: Progressing     Problem: Pain  Goal: Takes deep breaths with improved pain control throughout the shift  4/17/2024 1236 by Celia James RN  Outcome: Adequate for Discharge  4/17/2024 1236 by Celia James RN  Outcome: Progressing  Goal: Turns in bed with improved pain control throughout the shift  4/17/2024 1236 by Celia James RN  Outcome: Adequate for Discharge  4/17/2024 1236 by Celia James RN  Outcome: Progressing  Goal: Walks with improved pain control throughout the shift  4/17/2024 1236 by Celia James RN  Outcome: Adequate for Discharge  4/17/2024 1236 by Celia James RN  Outcome: Progressing  Goal: Performs ADL's with improved pain control throughout shift  4/17/2024 1236 by Celia James RN  Outcome: Adequate for Discharge  4/17/2024 1236 by Celia James RN  Outcome: Progressing  Goal: Participates in PT with improved pain control throughout the shift  4/17/2024 1236 by Celia James RN  Outcome: Adequate for Discharge  4/17/2024 1236 by Celia James RN  Outcome: Progressing  Goal: Free from opioid side effects throughout the shift  4/17/2024 1236 by Celia James RN  Outcome: Adequate for Discharge  4/17/2024 1236 by Celia James RN  Outcome: Progressing  Goal: Free from acute confusion related to pain meds throughout the shift  4/17/2024 1236 by Celia James, RN  Outcome: Adequate for Discharge  4/17/2024 1236 by Celia James RN  Outcome: Progressing     Problem: Pain  Goal: My pain/discomfort is manageable  4/17/2024 1236 by Celia James RN  Outcome: Adequate for Discharge  4/17/2024 1236 by Celia James RN  Outcome: Progressing     Problem: Daily Care  Goal: Daily care needs are  met  4/17/2024 1236 by Celia James RN  Outcome: Adequate for Discharge  4/17/2024 1236 by Celia James RN  Outcome: Progressing     Problem: Psychosocial Needs  Goal: Demonstrates ability to cope with hospitalization/illness  4/17/2024 1236 by Celia James RN  Outcome: Adequate for Discharge  4/17/2024 1236 by Celia James RN  Outcome: Progressing  Goal: Collaborate with me, my family, and caregiver to identify my specific goals  4/17/2024 1236 by Celia James RN  Outcome: Adequate for Discharge  4/17/2024 1236 by Celia James RN  Outcome: Progressing     Problem: Discharge Barriers  Goal: My discharge needs are met  4/17/2024 1236 by Celia James RN  Outcome: Adequate for Discharge  4/17/2024 1236 by Celia James RN  Outcome: Progressing   The patient's goals for the shift include comfort    The clinical goals for the shift include pain management    Over the shift, the patient did not make progress toward the following goals. Barriers to progression include. Recommendations to address these barriers include.

## 2024-04-17 NOTE — PROGRESS NOTES
Occupational Therapy    Evaluation/Treatment    Patient Name: Mague Hummel  MRN: [id number]  : 1960  Today's Date: 24  Time Calculation  Start Time: 1013  Stop Time: 1026  Time Calculation (min): 13 min       Assessment:  OT Assessment: 63 year old female who is here for elective surgery R LE due to increased pain, swelling since , patient has R TKA . S/P  Arthrotomy right total knee with polyethylene exchange, synovectomy anterior and posterior joint capsule done 24. Patient is limited this date with pain, decreased sstrength, decreased endurance, is functioning below her baselein, will benefit from continued OT intervention and low intensity rehab is recommended.  Prognosis: Good  Barriers to Discharge: None  Evaluation/Treatment Tolerance: Patient limited by fatigue, Patient limited by pain  Medical Staff Made Aware: Yes  End of Session Communication:  (Patient with PT)  End of Session Patient Position:  (with PT up and walking.)  OT Assessment Results: Decreased ADL status, Decreased upper extremity strength, Decreased endurance, Decreased functional mobility, Decreased IADLs  Prognosis: Good  Barriers to Discharge: None  Evaluation/Treatment Tolerance: Patient limited by fatigue, Patient limited by pain  Medical Staff Made Aware: Yes  Strengths: Ability to acquire knowledge, Support of Caregivers, Premorbid level of function  Barriers to Participation: Comorbidities  Plan:  Treatment Interventions: ADL retraining, Functional transfer training, Endurance training, Patient/family training, Equipment evaluation/education, Compensatory technique education  OT Frequency: 5 times per week  OT Discharge Recommendations: Low intensity level of continued care  Equipment Recommended upon Discharge: Wheeled walker  OT Recommended Transfer Status: Assist of 1  OT - OK to Discharge: Yes  Treatment Interventions: ADL retraining, Functional transfer training, Endurance training, Patient/family  training, Equipment evaluation/education, Compensatory technique education    Subjective   Current Problem:  1. 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, initial encounter (CMS-[facility])  Tissue/Wound Culture/Smear    Tissue/Wound Culture/Smear    Tissue/Wound Culture/Smear    Tissue/Wound Culture/Smear    Tissue/Wound Culture/Smear    Tissue/Wound Culture/Smear    aspirin 81 mg EC tablet    docusate sodium (Colace) 100 mg capsule    acetaminophen (Tylenol) 500 mg tablet    oxyCODONE (Roxicodone) 5 mg immediate release tablet    ibuprofen 600 mg tablet    Referral to Home Health    CANCELED: AFB Culture/Smear    CANCELED: AFB Culture/Smear    CANCELED: AFB Culture/Smear    CANCELED: AFB Culture/Smear    CANCELED: AFB Culture/Smear    CANCELED: AFB Culture/Smear    CANCELED: Fungal Culture/Smear    CANCELED: Fungal Culture/Smear    CANCELED: Fungal Culture/Smear    CANCELED: Fungal Culture/Smear    CANCELED: Fungal Culture/Smear    CANCELED: Fungal Culture/Smear    CANCELED: Tissue/Wound Culture/Smear    CANCELED: Tissue/Wound Culture/Smear    CANCELED: Tissue/Wound Culture/Smear    CANCELED: Tissue/Wound Culture/Smear    CANCELED: Tissue/Wound Culture/Smear    CANCELED: Tissue/Wound Culture/Smear        General:   OT Received On: 04/17/24  General  Reason for Referral: Decreased ADL post surgery .  Referred By: Dr Eller  Past Medical History Relevant to Rehab: GERD, HLD, R TKA 7/23  Family/Caregiver Present: No  Co-Treatment: PT  Co-Treatment Reason: Partial Session for optimisation of patient care and handling for intial OT evaluation.  Prior to Session Communication: Bedside nurse  Patient Position Received: Bed, 3 rail up, Alarm off, not on at start of session  Preferred Learning Style: verbal  General Comment: Cleared to see patient for OT, patient agreeable to therapy. Patient met in the room in the bed.  Precautions:  Hearing/Visual Limitations: glasses for reading.  LE Weight Bearing Status:  Weight Bearing as Tolerated (RLE)  Medical Precautions: Fall precautions  Post-Surgical Precautions: Right total knee precautions  Precautions Comment: Patient was instructed in surgery precautions this date, instructed to call staff to use the restroom while at the hospital  Vital Signs:     Pain:  Pain Assessment  Pain Assessment: 0-10  Pain Score: 4  Pain Type: Surgical pain  Pain Location: Knee  Pain Orientation: Right  Pain Interventions: Repositioned (per patient was given pain medication by nurse.)  Response to Interventions: Patient reports decreased pain with movement.    Objective   Cognition:  Overall Cognitive Status: Within Functional Limits  Orientation Level: Oriented X4  Safety/Judgement: Within Functional Limits  Insight: Within function limits    Home Living:  Type of Home: House  Lives With: Spouse  Home Adaptive Equipment: Walker rolling or standard, Cane  Home Layout: Multi-level, Laundry in basement, Stairs to alternate level with rails  Alternate Level Stairs-Rails: Left  Alternate Level Stairs-Number of Steps: 12  Home Access: Stairs to enter without rails  Entrance Stairs-Rails: None  Entrance Stairs-Number of Steps: 2  Bathroom Shower/Tub: Walk-in shower  Bathroom Toilet: Standard  Bathroom Accessibility: Per patient bed/bath on the second floor.  Home Living Comments: Patient reports using the laundry in the basement as well as the bed/bathroom on the second floor.  Prior Function:  Level of [place]: Independent with ADLs and functional transfers, Independent with homemaking with ambulation  Receives Help From: Family  ADL Assistance: Independent  Homemaking Assistance: Independent  Ambulatory Assistance: Independent (no AD in the house, cane outside the house lately due to pain.)  Vocational: Retired  Hand Dominance: Right  Prior Function Comments: Patient reports driving, no falls.  IADL History:  Homemaking Responsibilities: Yes  Meal Prep Responsibility: Primary  Laundry  Responsibility: Primary  Cleaning Responsibility: Primary  Bill Paying/Finance Responsibility: Primary  Shopping Responsibility: Primary  Current License: Yes  Mode of Transportation: Car  ADL:  Eating Assistance: Independent  Eating Deficit: Setup  Grooming Assistance: Stand by  Grooming Deficit: Supervision/safety, Standing with assistive device, Wash/dry face, Wash/dry hands, Teeth care  Bathing Assistance: Not performed  UE Dressing Assistance: Minimal  UE Dressing Deficit:  (john greenwood)  LE Dressing Assistance: Minimal  LE Dressing Deficit: Thread RLE into underwear, Thread LLE into underwear  Toileting Assistance with Device: Stand by  Functional Assistance: Stand by  ADL Comments: Patient was able to complete LB dressing, toileing and grooming this date . Patient was instructed in DME for home.    Activity Tolerance:  Endurance: Decreased tolerance for upright activites  Activity Tolerance Comments: Patient limited this date with pain, decreased endurance.  Functional Standing Tolerance:     Bed Mobility/Transfers: Bed Mobility  Bed Mobility: Yes (Patient was Close Supervision this date with the bed flat , increased time to get to the EOB this date, no dizziness,.)    Transfers  Transfer: Yes (Patient needed Close Supervision to complete transfers from the bed , toilet this date with the walker with minimal V/C , adhering to R TKA precautions.)    Functional Mobility:  Functional Mobility  Functional Mobility Performed: Yes (Patient was able to walk in the room and to the bathroom this date with CLose Supervision with the walker, no R knee buckling noted this date. Patient was able to complete functional standing at the sink to do grooming this date.)  Sitting Balance:  Static Sitting Balance  Static Sitting-Comment/Number of Minutes: Good, 5min  Dynamic Sitting Balance  Dynamic Sitting-Comments: Good, 5min  Standing Balance:  Static Standing Balance  Static Standing-Comment/Number of Minutes: Good, 4min with  SBA  Dynamic Standing Balance  Dynamic Standing-Comments: Good, 4min SBA    Vision:Vision - Basic Assessment  Current Vision: Wears glasses all the time  Sensation:  Sensation Comment: Patient denies any tingling/numbness in the hands.  Strength:  Strength Comments: 4/5 BUE    Coordination:  Movements are Fluid and Coordinated: Yes (BUE)   Hand Function:  Hand Function  Gross Grasp: Functional  Coordination: Functional      Outcome Measures: [facility] Daily Activity  Putting on and taking off regular lower body clothing: Total  Bathing (including washing, rinsing, drying): A little  Putting on and taking off regular upper body clothing: A little  Toileting, which includes using toilet, bedpan or urinal: A little  Taking care of personal grooming such as brushing teeth: A little  Eating Meals: A little  Daily Activity - Total Score: 16        Education Documentation  ADL Training, taught by Julia Figueroa OT at 4/17/2024  1:51 PM.  Learner: Patient  Readiness: Acceptance  Method: Explanation  Response: Verbalizes Understanding, Demonstrated Understanding, Needs Reinforcement  Comment: Patient was instructed in surgery precautions,functional transfers/mobility, ADL retraining initiated this date.    Education Comments  No comments found.        OP EDUCATION:       Goals:  Encounter Problems       Encounter Problems (Active)       OT Goals       Patient will be able to complete all ADL tasks of bathing, grooming, dressing and toileting with modified independence with AE as needed, adhering to R TKA precautions.  (Progressing)       Start:  04/17/24    Expected End:  05/01/24            Patient will be able to complete functional transfers/mobility with the walker with Modified independence. (Progressing)       Start:  04/17/24    Expected End:  05/01/24            Patient will be able to tolerate 15 min of functional standing with G balance in prep for ADL/transfers.  (Progressing)       Start:  04/17/24    Expected End:   05/01/24

## 2024-04-17 NOTE — NURSING NOTE
Sandhu  removed at this time, pt assisted to sitting position and then to standing, gait belt in place, pt able to take 5 steps to head of bed with verbal cues and minimal assist, tolerated well

## 2024-04-17 NOTE — PROGRESS NOTES
04/17/24 1240   Current Planned Discharge Disposition   Current Planned Discharge Disposition Home H

## 2024-04-17 NOTE — PROGRESS NOTES
"Mague Hummel is a 63 y.o. 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    Subjective   Patient resting in bed eating breakfast.  Pain controlled.  Denies chest pain or shortness of breath.  Tolerating p.o. intake.  Feels comfortable with ambulation today.  Wants to go home.       Objective     Physical Exam  Alert and oriented x 3  Right lower extremity: Dressing dry.  Drain output noted.  Able to straight leg raise with some difficulty.  Compartments are soft in the thigh and calf.  Able to flex and extend the ankle.  Distally neurovascularly intact.    Last Recorded Vitals  Blood pressure 152/71, pulse 99, temperature 36.7 °C (98.1 °F), temperature source Oral, resp. rate 17, height 1.626 m (5' 4.02\"), weight 82.5 kg (181 lb 14.1 oz), SpO2 95%.      Relevant Results  I reviewed culture information: Gram stain negative for any organisms.  Only tissue culture #3 showed any polymorphonuclear cells.    Results for orders placed or performed during the hospital encounter of 04/16/24 (from the past 24 hour(s))   Tissue/Wound Culture/Smear    Specimen: SOFT TISSUE RESECTION   Result Value Ref Range    Gram Stain No polymorphonuclear leukocytes seen     Gram Stain No organisms seen    Tissue/Wound Culture/Smear    Specimen: SOFT TISSUE RESECTION   Result Value Ref Range    Gram Stain No polymorphonuclear leukocytes seen     Gram Stain No organisms seen    Tissue/Wound Culture/Smear    Specimen: SOFT TISSUE RESECTION   Result Value Ref Range    Gram Stain (2+) Few Polymorphonuclear leukocytes     Gram Stain No organisms seen    CBC   Result Value Ref Range    WBC 15.1 (H) 4.4 - 11.3 x10*3/uL    nRBC 0.0 0.0 - 0.0 /100 WBCs    RBC 3.27 (L) 4.00 - 5.20 x10*6/uL    Hemoglobin 10.3 (L) 12.0 - 16.0 g/dL    Hematocrit 31.6 (L) 36.0 - 46.0 %    MCV 97 80 - 100 fL    MCH 31.5 26.0 - 34.0 pg    MCHC 32.6 32.0 - 36.0 g/dL    RDW 12.8 " 11.5 - 14.5 %    Platelets 296 150 - 450 x10*3/uL   Basic metabolic panel   Result Value Ref Range    Glucose 118 (H) 65 - 99 mg/dL    Sodium 139 133 - 145 mmol/L    Potassium 4.0 3.4 - 5.1 mmol/L    Chloride 102 97 - 107 mmol/L    Bicarbonate 24 24 - 31 mmol/L    Urea Nitrogen 11 8 - 25 mg/dL    Creatinine 0.60 0.40 - 1.60 mg/dL    eGFR >90 >60 mL/min/1.73m*2    Calcium 9.1 8.5 - 10.4 mg/dL    Anion Gap 13 <=19 mmol/L       Assessment/Plan   Principal Problem:    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  Pain is controlled.  Wants to go home.  I do not have any clinical indication to start ongoing antibiotics at this time.  Will follow culture results as they become available and change plans if necessary.  Okay to start home physical therapy.  Follow-up with me in 2 weeks.  DVT risk: Aspirin twice daily        Cordell Eller MD

## 2024-04-17 NOTE — CARE PLAN
The patient's goals for the shift include comfort    The clinical goals for the shift include pain management    Over the shift, the patient did  make progress toward the following goals.

## 2024-04-17 NOTE — HH CARE COORDINATION
Home Care received a Referral for Physical Therapy. We have processed the referral for a Start of Care on 04/18.     If you have any questions or concerns, please feel free to contact us at [phone]. Follow the prompts, enter your five digit zip code, and you will be directed to your care team on [facility] 1.

## 2024-04-17 NOTE — PROGRESS NOTES
Physical Therapy    Physical Therapy Evaluation & Treatment    Patient Name: Mague Hummel  MRN: [id number]  Today's Date: 4/17/2024   Time Calculation  Start Time: 1015  Stop Time: 1054  Time Calculation (min): 39 min    Assessment/Plan   PT Assessment Results: Decreased strength, Decreased range of motion, Decreased mobility, Pain, Obesity. Today she required minimally increased assist during functional mobility compared to her baseline. She needed minimal instruction in knee precautions and walker safety. The pt would benefit from continued skilled PT services for maximizing independence and safety prior to & after discharge (LOW intensity).  Rehab Prognosis: Good  Strengths: Ability to acquire knowledge, Attitude of self, Access to adaptive/assistive products, Premorbid level of function, Rehab experience, Support and attitude of living partners  Barriers to Participation: Comorbidities, Housing layout  End of Session Communication: Bedside nurse  End of Session Patient Position: Up in chair, Alarm off, not on at start of session (call light and needs within reach)      PT Plan  Treatment/Interventions: Bed mobility, Transfer training, Gait training, Stair training, Strengthening, Range of motion, Therapeutic exercise, Therapeutic activity, Home exercise program  PT Plan: Skilled PT  PT Frequency: BID  PT Discharge Recommendations: Low intensity level of continued care  PT Recommended Transfer Status: Assist x1, Stand by assist (with FWW)  PT - OK to Discharge: Yes      Subjective     General Visit Information:  Reason for Referral: Impaired functional mobility. This 63 y.o. female was admitted for an inflammatory reaction to her R TKA prosthesis. She was now s/p R prosthetic knee athrotomy with polyethylene exchange and synovectomies of the anterior & posterior joint capsule by Dr. Eller on 4/16/24.    Past Medical History Relevant to Rehab: GERD, HLD, R TKA (July 2023)    Co-Treatment: OT (partial cotx for  maximal safety with mobility)  Prior to Session Communication: Bedside nurse  Patient Position Received: Bed, 3 rail up, Alarm off, not on at start of session  General Comment: RN cleared pt for participation and stated post-TKA precautions were in place. Pt agreed to session and participated fully.    Home Living:  Type of Home: House  Lives With: Spouse (retired and able to provide some assist, if needed)  Home Adaptive Equipment:  (straight cane, FWW)  Home Layout: Multi-level, Laundry in basement, Bed/bath upstairs  Alternate Level Stairs-Number of Steps:  (12 stairs with L rail (ascending) from basement and to 2nd floor)  Entrance Stairs-Number of Steps:  (2 stairs without rails)  Bathroom Shower/Tub: Walk-in shower  Bathroom Equipment: None    Prior Level of Function:  Prior Function Per Pt Report  ADL Assistance: Independent  Homemaking Assistance: Independent  Ambulatory Assistance:  (Denied any falls since original TKA in July 2023)  Transfers:  (mod I)  Gait:  (indep at home, most recently used staight cane in community due to R knee pain)  Stairs:  (mod I with rail)  Vocational: Retired ()  Prior Function Comments: (+) driving    Precautions:  Hearing/Visual Limitations: Hearing WFL; glasses at all times  Medical Precautions: Fall precautions; R total knee precautions; RLE WBAT    Objective   Pain:  Pain Scale: 0-10 (4/10 surgical pain at R knee. She reported taking pain med earlier this AM. After amb, 5/10 pain at same site. Ice pack applied to R knee after tx.)    Cognition:  Overall Cognitive Status: Within Functional Limits (A&O x4)  Impulsive:  (Not this session)    General Assessments:  Activity Tolerance  Tolerated 30 minutes out of bed with multiple rest breaks    Sensation  Not tested; denied paresthesias    AROM/Strength  BLE AROM: WFL except R knee impaired as anticipated post-op.  BLE strength: based on observation of mobility, >/=3+/5 EXCEPT R quad >/=3-/5.    Perception  Motor  Planning: Appears intact    Coordination  Movements are Fluid and Coordinated: Yes (with slowed rate at RLE)    Postural Control  WFL (with fwd head; protracted shoulders)    Static Sitting Balance  At EOB with BUE support & no UE support: distant S  Dynamic Sitting Balance  At EOB with BUE support and unilateral LE support: close S    Static Standing Balance  With unilateal support at FWW: close S  Dynamic Standing Balance  With BUE support at FWW: close S    Functional Assessments:  Bed Mobility  Supine>sit: distant S at flat bed (no rail); toward R side to mimic home set up    Transfers with FWW at EOB and toilet (with R grab bar)  Sit<>stand: close S with minimal cues for walker safety    Ambulation/Gait Training   15 ft & 100 ft (including stairs) with FWW and close S. Step through pattern with slowed velocity and nearly continuous movement. Cued for smaller walker movements with tight turns.    Stairs (number limited by IV line; pt requested to use L rail, ascending)  2 stairs with L rail (ascending) and close S. Step-to-step technique and BLE sequencing to protect R side (no cues required). Slowed pacing & steady throughout,       Treatments:  Therapeutic Exercise in supported sitting, x10 each  -RLE knee flex, LAQ     Therapeutic Activity  -HEP hand out issued (used post-TKA hand out, pt reported she had her old hand out from the original surgery at home). Verbal review of supine therex provided.  -Pt educated in R knee precautions   -Educated pt in POC & frequency of tx. Pt reported she planned to discharge home this afternoon. She denied any concerns re: functional mobility within her home. Pt agreed with not having a 2nd session this afternoon. RN informed after session.  -Instructed her in use of call light for nursing staff assist with all mobility; pt verbalized understanding.  -See above for multiple trials of sit<>stand as well as cues during mobility    Outcome Measures:  [facility] Basic  Mobility  Turning from your back to your side while in a flat bed without using bedrails: A little  Moving from lying on your back to sitting on the side of a flat bed without using bedrails: A little  Moving to and from bed to chair (including a wheelchair): A little  Standing up from a chair using your arms (e.g. wheelchair or bedside chair): A little  To walk in hospital room: A little  Climbing 3-5 steps with railing: A little  Basic Mobility - Total Score: 18    Encounter Problems       Encounter Problems (Active)       Functional Mobility       Pt will transition supine<>sit at flat bed at mod I level.       Start:  04/17/24    Expected End:  04/30/24            Pt will transfer sit<>stand with FWW at mod I level.       Start:  04/17/24    Expected End:  04/30/24            Pt will ambulate >/=100 ft with FWW at mod I level.       Start:  04/17/24    Expected End:  04/30/24            Pt will negotiate: a) 2 stairs with Ivanna x1 via hand held position and b) 12 stairs with L rail (ascending) with close S.       Start:  04/17/24    Expected End:  04/30/24            Pt will ahere to total knee precautions independently.       Start:  04/17/24    Expected End:  04/30/24                   Education Documentation  No documentation found.  Education Comments  No comments found.

## 2024-04-17 NOTE — PROGRESS NOTES
TCC met with patient at the bedside. Pt is here for revision of right knee. Pt does not use oxygen at home or on dialysis. Pt drives and can shop, cook and clean. Pt is not a diabetic. PCP is Dr. Marquise Pelletier. Pharmacy of choice is [facility] on [address]. PT/OT skilled patient low intensity. Pt is agreeable to [facility]. Pt is accepted and has a SOC 04/18/24.     PATIENT HAS A SECURE DISCHARGE TO RETURN HOME WITH [facility], SOC 04/18/24.       04/17/24 3856   Discharge Planning   Living Arrangements Spouse/significant other   Support Systems Spouse/significant other   Assistance Needed none   Type of Residence Private residence   Number of Stairs to Enter Residence 2   Number of Stairs Within Residence 12   Do you have animals or pets at home? No   Who is requesting discharge planning? Provider   Home or Post Acute Services In home services   Type of Home Care Services Home OT;Home PT   Patient expects to be discharged to: home with [facility], SOC 04/18/24   Does the patient need discharge transport arranged? No   Financial Resource Strain   How hard is it for you to pay for the very basics like food, housing, medical care, and heating? Not hard   Housing Stability   In the last 12 months, was there a time when you were not able to pay the mortgage or rent on time? N   In the last 12 months, how many places have you lived? 1   In the last 12 months, was there a time when you did not have a steady place to sleep or slept in a shelter (including now)? N   Transportation Needs   In the past 12 months, has lack of transportation kept you from medical appointments or from getting medications? no   In the past 12 months, has lack of transportation kept you from meetings, work, or from getting things needed for daily living? No

## 2024-04-18 ENCOUNTER — PATIENT OUTREACH (OUTPATIENT)
Dept: PRIMARY CARE | Facility: CLINIC | Age: 64
End: 2024-04-18
Payer: MEDICARE

## 2024-04-18 ENCOUNTER — HOME CARE VISIT (OUTPATIENT)
Dept: HOME HEALTH SERVICES | Facility: HOME HEALTH | Age: 64
End: 2024-04-18
Payer: MEDICARE

## 2024-04-18 ENCOUNTER — DOCUMENTATION (OUTPATIENT)
Dept: PRIMARY CARE | Facility: CLINIC | Age: 64
End: 2024-04-18
Payer: MEDICARE

## 2024-04-18 VITALS
SYSTOLIC BLOOD PRESSURE: 124 MMHG | TEMPERATURE: 98.1 F | HEART RATE: 89 BPM | OXYGEN SATURATION: 98 % | DIASTOLIC BLOOD PRESSURE: 70 MMHG | RESPIRATION RATE: 18 BRPM

## 2024-04-18 PROCEDURE — 1090000001 HH PPS REVENUE CREDIT

## 2024-04-18 PROCEDURE — G0151 HHCP-SERV OF PT,EA 15 MIN: HCPCS | Mod: HHH

## 2024-04-18 PROCEDURE — 1090000002 HH PPS REVENUE DEBIT

## 2024-04-18 PROCEDURE — 169592 NO-PAY CLAIM PROCEDURE

## 2024-04-18 PROCEDURE — 0023 HH SOC

## 2024-04-18 ASSESSMENT — ACTIVITIES OF DAILY LIVING (ADL)
AMBULATION ASSISTANCE: ONE PERSON
OASIS_M1830: 03
ENTERING_EXITING_HOME: CONTACT GUARD ASSIST
AMBULATION_DISTANCE/DURATION_TOLERATED: 50 FEET
AMBULATION ASSISTANCE: CONTACT GUARD ASSIST
CURRENT_FUNCTION: ONE PERSON
AMBULATION ASSISTANCE: 1
AMBULATION ASSISTANCE ON FLAT SURFACES: 1
PHYSICAL TRANSFERS ASSESSED: 1

## 2024-04-18 ASSESSMENT — ENCOUNTER SYMPTOMS
LOWEST PAIN SEVERITY IN PAST 24 HOURS: 7/10
MUSCLE WEAKNESS: 1
PAIN: 1
OCCASIONAL FEELINGS OF UNSTEADINESS: 1
PAIN LOCATION - PAIN FREQUENCY: FREQUENT
PAIN LOCATION - PAIN SEVERITY: 7/10
PAIN LOCATION - RELIEVING FACTORS: REST, ICE, MEDS
LIMITED RANGE OF MOTION: 1
PAIN LOCATION - PAIN QUALITY: ACHING
PERSON REPORTING PAIN: PATIENT
PAIN LOCATION - EXACERBATING FACTORS: MOVEMENT
PAIN LOCATION: RIGHT KNEE
HIGHEST PAIN SEVERITY IN PAST 24 HOURS: 9/10

## 2024-04-18 NOTE — DISCHARGE SUMMARY
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 Uncomplicated usrgery . Admited to RNF. Cultures negative to date. No orgs on gram stain. Stable for discharge home. Plan to follow cultures outpatient and adjust treatment as needed.    Pertinent Physical Exam At Time of Discharge  Physical Exam    Home Medications     Medication List      START taking these medications     aspirin 81 mg EC tablet; Take 1 tablet (81 mg) by mouth 2 times a day   for 28 days.   docusate sodium 100 mg capsule; Commonly known as: Colace; Take 1   capsule (100 mg) by mouth 2 times a day for 15 days.   oxyCODONE 5 mg immediate release tablet; Commonly known as: Roxicodone;   Take 1 tablet (5 mg) by mouth every 4 hours if needed for severe pain (7 -   10) for up to 7 days.     CHANGE how you take these medications     * acetaminophen 500 mg tablet; Commonly known as: Tylenol; What changed:   Another medication with the same name was added. Make sure you understand   how and when to take each.   * acetaminophen 500 mg tablet; Commonly known as: Tylenol; Take 2   tablets (1,000 mg) by mouth every 6 hours if needed for mild pain (1 - 3)   for up to 15 days.; What changed: You were already taking a medication   with the same name, and this prescription was added. Make sure you   understand how and when to take each.   * ibuprofen 800 mg tablet; What changed: Another medication with the   same name was added. Make sure you understand how and when to take each.   * ibuprofen 600 mg tablet; Take 1 tablet (600 mg) by mouth every 6 hours   if  needed for mild pain (1 - 3) for up to 5 days.; What changed: You were   already taking a medication with the same name, and this prescription was   added. Make sure you understand how and when to take each.  * This list has 4 medication(s) that are the same as other medications   prescribed for you. Read the directions carefully, and ask your doctor or   other care provider to review them with you.     CONTINUE taking these medications     atorvastatin 10 mg tablet; Commonly known as: Lipitor; TAKE 1 TABLET   DAILY   Claritin 10 mg tablet; Generic drug: loratadine   etodolac 500 mg tablet; Commonly known as: Lodine   multivitamin tablet   PriLOSEC 10 mg packet for oral suspension; Generic drug: omeprazole     STOP taking these medications     chlorhexidine 0.12 % solution; Commonly known as: Peridex   nitrofurantoin (macrocrystal-monohydrate) 100 mg capsule; Commonly known   as: Macrobid       Outpatient Follow-Up  Future Appointments   Date Time Provider Department Center   4/18/2024  9:30 AM Nestor Trinidad PT [facility] Stevo Eller MD

## 2024-04-19 PROCEDURE — 1090000002 HH PPS REVENUE DEBIT

## 2024-04-19 PROCEDURE — 1090000001 HH PPS REVENUE CREDIT

## 2024-04-20 PROCEDURE — 1090000002 HH PPS REVENUE DEBIT

## 2024-04-20 PROCEDURE — 1090000001 HH PPS REVENUE CREDIT

## 2024-04-21 PROCEDURE — 1090000001 HH PPS REVENUE CREDIT

## 2024-04-21 PROCEDURE — 1090000002 HH PPS REVENUE DEBIT

## 2024-04-22 PROCEDURE — 1090000001 HH PPS REVENUE CREDIT

## 2024-04-22 PROCEDURE — 1090000002 HH PPS REVENUE DEBIT

## 2024-04-23 ENCOUNTER — HOME CARE VISIT (OUTPATIENT)
Dept: HOME HEALTH SERVICES | Facility: HOME HEALTH | Age: 64
End: 2024-04-23
Payer: MEDICARE

## 2024-04-23 VITALS
DIASTOLIC BLOOD PRESSURE: 78 MMHG | HEART RATE: 73 BPM | SYSTOLIC BLOOD PRESSURE: 140 MMHG | RESPIRATION RATE: 18 BRPM | OXYGEN SATURATION: 95 % | TEMPERATURE: 97.3 F

## 2024-04-23 LAB
BACTERIA SPEC CULT: NORMAL
GRAM STN SPEC: NORMAL

## 2024-04-23 PROCEDURE — G0151 HHCP-SERV OF PT,EA 15 MIN: HCPCS | Mod: HHH

## 2024-04-23 PROCEDURE — 1090000001 HH PPS REVENUE CREDIT

## 2024-04-23 PROCEDURE — 1090000002 HH PPS REVENUE DEBIT

## 2024-04-23 SDOH — HEALTH STABILITY: PHYSICAL HEALTH: EXERCISE ACTIVITIES SETS: 1

## 2024-04-23 SDOH — HEALTH STABILITY: PHYSICAL HEALTH: PHYSICAL EXERCISE: 10

## 2024-04-23 SDOH — HEALTH STABILITY: PHYSICAL HEALTH: EXERCISE ACTIVITY: APS, GLUTE/QUAD SETS, HEEL SLIDE, SLR, SAQ, HIP ABD

## 2024-04-23 SDOH — HEALTH STABILITY: PHYSICAL HEALTH: PHYSICAL EXERCISE: SUPINE

## 2024-04-23 SDOH — HEALTH STABILITY: PHYSICAL HEALTH

## 2024-04-23 SDOH — HEALTH STABILITY: PHYSICAL HEALTH: PHYSICAL EXERCISE: SITTING

## 2024-04-23 SDOH — HEALTH STABILITY: PHYSICAL HEALTH: EXERCISE ACTIVITY: APS, MARCHING, LAQ, HIP ABD/ADD, HEEL SLIDE

## 2024-04-23 ASSESSMENT — ENCOUNTER SYMPTOMS
PAIN LOCATION - PAIN QUALITY: ACHING
PAIN LOCATION - RELIEVING FACTORS: REST, ICE, MEDS
PERSON REPORTING PAIN: PATIENT
PAIN: 1
PAIN LOCATION: RIGHT KNEE
LOWEST PAIN SEVERITY IN PAST 24 HOURS: 4/10
HIGHEST PAIN SEVERITY IN PAST 24 HOURS: 5/10
PAIN LOCATION - PAIN SEVERITY: 5/10
PAIN LOCATION - EXACERBATING FACTORS: MOVEMENT

## 2024-04-24 PROCEDURE — 1090000001 HH PPS REVENUE CREDIT

## 2024-04-24 PROCEDURE — 1090000002 HH PPS REVENUE DEBIT

## 2024-04-25 PROCEDURE — 1090000001 HH PPS REVENUE CREDIT

## 2024-04-25 PROCEDURE — 1090000002 HH PPS REVENUE DEBIT

## 2024-04-26 ENCOUNTER — HOME CARE VISIT (OUTPATIENT)
Dept: HOME HEALTH SERVICES | Facility: HOME HEALTH | Age: 64
End: 2024-04-26
Payer: MEDICARE

## 2024-04-26 VITALS
TEMPERATURE: 98.8 F | OXYGEN SATURATION: 96 % | DIASTOLIC BLOOD PRESSURE: 72 MMHG | SYSTOLIC BLOOD PRESSURE: 128 MMHG | HEART RATE: 98 BPM | RESPIRATION RATE: 18 BRPM

## 2024-04-26 PROCEDURE — G0151 HHCP-SERV OF PT,EA 15 MIN: HCPCS | Mod: HHH

## 2024-04-26 PROCEDURE — 1090000002 HH PPS REVENUE DEBIT

## 2024-04-26 PROCEDURE — 1090000001 HH PPS REVENUE CREDIT

## 2024-04-26 SDOH — HEALTH STABILITY: PHYSICAL HEALTH: EXERCISE ACTIVITIES SETS: 1

## 2024-04-26 SDOH — HEALTH STABILITY: PHYSICAL HEALTH: PHYSICAL EXERCISE: 10

## 2024-04-26 SDOH — HEALTH STABILITY: PHYSICAL HEALTH: PHYSICAL EXERCISE: SUPINE

## 2024-04-26 SDOH — HEALTH STABILITY: PHYSICAL HEALTH: PHYSICAL EXERCISE: STANDING

## 2024-04-26 SDOH — HEALTH STABILITY: PHYSICAL HEALTH

## 2024-04-26 SDOH — HEALTH STABILITY: PHYSICAL HEALTH: EXERCISE ACTIVITY: APS, MARCHING, LAQ, HEEL SLIDE, HIP ABD/ADD

## 2024-04-26 SDOH — HEALTH STABILITY: PHYSICAL HEALTH: EXERCISE ACTIVITY: APS, GLUTE/QUAD SETS, HEEL SLIDE, SLR, SAQ, HIP ABD

## 2024-04-26 SDOH — HEALTH STABILITY: PHYSICAL HEALTH: EXERCISE ACTIVITY: CALF RAISES, HIP FLEX/EXT/ABD, HS CURL, MARCHING, MINI SQUATS

## 2024-04-26 SDOH — HEALTH STABILITY: PHYSICAL HEALTH: PHYSICAL EXERCISE: SITTING

## 2024-04-26 ASSESSMENT — ENCOUNTER SYMPTOMS
PAIN LOCATION - RELIEVING FACTORS: REST, ICE, MEDS
PAIN LOCATION - PAIN QUALITY: INCISIONAL
PAIN LOCATION - EXACERBATING FACTORS: MOVEMENT
HIGHEST PAIN SEVERITY IN PAST 24 HOURS: 4/10
LOWEST PAIN SEVERITY IN PAST 24 HOURS: 3/10
PAIN: 1
PAIN LOCATION - PAIN SEVERITY: 4/10
PAIN LOCATION: RIGHT KNEE
PERSON REPORTING PAIN: PATIENT

## 2024-04-27 PROCEDURE — 1090000002 HH PPS REVENUE DEBIT

## 2024-04-27 PROCEDURE — 1090000001 HH PPS REVENUE CREDIT

## 2024-04-28 PROCEDURE — 1090000001 HH PPS REVENUE CREDIT

## 2024-04-28 PROCEDURE — 1090000002 HH PPS REVENUE DEBIT

## 2024-04-29 PROCEDURE — 1090000001 HH PPS REVENUE CREDIT

## 2024-04-29 PROCEDURE — 1090000002 HH PPS REVENUE DEBIT

## 2024-04-30 ENCOUNTER — HOME CARE VISIT (OUTPATIENT)
Dept: HOME HEALTH SERVICES | Facility: HOME HEALTH | Age: 64
End: 2024-04-30
Payer: MEDICARE

## 2024-04-30 VITALS
HEART RATE: 100 BPM | OXYGEN SATURATION: 98 % | RESPIRATION RATE: 18 BRPM | DIASTOLIC BLOOD PRESSURE: 68 MMHG | SYSTOLIC BLOOD PRESSURE: 136 MMHG

## 2024-04-30 PROCEDURE — 1090000001 HH PPS REVENUE CREDIT

## 2024-04-30 PROCEDURE — 1090000002 HH PPS REVENUE DEBIT

## 2024-04-30 PROCEDURE — G0151 HHCP-SERV OF PT,EA 15 MIN: HCPCS | Mod: HHH

## 2024-04-30 SDOH — HEALTH STABILITY: PHYSICAL HEALTH: PHYSICAL EXERCISE: STANDING

## 2024-04-30 SDOH — HEALTH STABILITY: PHYSICAL HEALTH: PHYSICAL EXERCISE: 15

## 2024-04-30 SDOH — HEALTH STABILITY: PHYSICAL HEALTH: EXERCISE ACTIVITY: CALF RAISES, HIP FLEX/EXT/ABD, HS CURL, MARCHING, MINI SQUATS

## 2024-04-30 SDOH — HEALTH STABILITY: PHYSICAL HEALTH

## 2024-04-30 SDOH — HEALTH STABILITY: PHYSICAL HEALTH: EXERCISE ACTIVITIES SETS: 1

## 2024-04-30 SDOH — HEALTH STABILITY: PHYSICAL HEALTH: EXERCISE ACTIVITY: APS, MARCHING, LAQ, HEEL SLIDE, HIP ABD/ADD

## 2024-04-30 SDOH — HEALTH STABILITY: PHYSICAL HEALTH: PHYSICAL EXERCISE: SITTING

## 2024-04-30 SDOH — HEALTH STABILITY: PHYSICAL HEALTH: PHYSICAL EXERCISE: SUPINE

## 2024-04-30 SDOH — HEALTH STABILITY: PHYSICAL HEALTH: EXERCISE ACTIVITY: APS, GLUTE/QUAD SETS, HEEL SLIDE, SLR, SAQ, HIP ABD

## 2024-04-30 ASSESSMENT — ENCOUNTER SYMPTOMS
PAIN: 1
PERSON REPORTING PAIN: PATIENT
PAIN LOCATION - PAIN QUALITY: ACHING
HIGHEST PAIN SEVERITY IN PAST 24 HOURS: 4/10
PAIN LOCATION - EXACERBATING FACTORS: MOVEMENT
PAIN LOCATION - PAIN SEVERITY: 4/10
SUBJECTIVE PAIN PROGRESSION: GRADUALLY IMPROVING
LOWEST PAIN SEVERITY IN PAST 24 HOURS: 2/10
PAIN LOCATION - RELIEVING FACTORS: REST, ICE
PAIN LOCATION: RIGHT KNEE

## 2024-05-01 PROCEDURE — 1090000002 HH PPS REVENUE DEBIT

## 2024-05-01 PROCEDURE — 1090000001 HH PPS REVENUE CREDIT

## 2024-05-02 ENCOUNTER — HOME CARE VISIT (OUTPATIENT)
Dept: HOME HEALTH SERVICES | Facility: HOME HEALTH | Age: 64
End: 2024-05-02
Payer: MEDICARE

## 2024-05-02 VITALS
TEMPERATURE: 98.6 F | RESPIRATION RATE: 18 BRPM | SYSTOLIC BLOOD PRESSURE: 128 MMHG | HEART RATE: 101 BPM | DIASTOLIC BLOOD PRESSURE: 64 MMHG | OXYGEN SATURATION: 98 %

## 2024-05-02 PROCEDURE — 1090000002 HH PPS REVENUE DEBIT

## 2024-05-02 PROCEDURE — G0151 HHCP-SERV OF PT,EA 15 MIN: HCPCS | Mod: HHH

## 2024-05-02 PROCEDURE — 1090000001 HH PPS REVENUE CREDIT

## 2024-05-02 ASSESSMENT — ENCOUNTER SYMPTOMS
PAIN LOCATION - EXACERBATING FACTORS: POST EXERCISE
PAIN LOCATION: RIGHT KNEE
PERSON REPORTING PAIN: PATIENT
LIMITED RANGE OF MOTION: 1
PAIN: 1
SUBJECTIVE PAIN PROGRESSION: GRADUALLY IMPROVING
MUSCLE WEAKNESS: 1
HIGHEST PAIN SEVERITY IN PAST 24 HOURS: 2/10
PAIN LOCATION - PAIN QUALITY: ACHING
PAIN LOCATION - RELIEVING FACTORS: MEDS
PAIN LOCATION - PAIN SEVERITY: 2/10

## 2024-05-02 ASSESSMENT — ACTIVITIES OF DAILY LIVING (ADL)
AMBULATION ASSISTANCE: 1
OASIS_M1830: 00
CURRENT_FUNCTION: INDEPENDENT
AMBULATION ASSISTANCE: INDEPENDENT
HOME_HEALTH_OASIS: 00
AMBULATION_DISTANCE/DURATION_TOLERATED: 300 FEET
AMBULATION ASSISTANCE ON FLAT SURFACES: 1
PHYSICAL TRANSFERS ASSESSED: 1

## 2024-05-21 ENCOUNTER — OFFICE VISIT (OUTPATIENT)
Dept: WOUND CARE | Facility: HOSPITAL | Age: 64
End: 2024-05-21
Payer: MEDICARE

## 2024-05-21 PROCEDURE — 97598 DBRDMT OPN WND ADDL 20CM/<: CPT

## 2024-05-21 PROCEDURE — 97597 DBRDMT OPN WND 1ST 20 CM/<: CPT

## 2024-05-21 PROCEDURE — 99214 OFFICE O/P EST MOD 30 MIN: CPT

## 2024-05-22 ENCOUNTER — APPOINTMENT (OUTPATIENT)
Dept: WOUND CARE | Facility: HOSPITAL | Age: 64
End: 2024-05-22
Payer: MEDICARE

## 2024-05-28 ENCOUNTER — OFFICE VISIT (OUTPATIENT)
Dept: WOUND CARE | Facility: HOSPITAL | Age: 64
End: 2024-05-28
Payer: MEDICARE

## 2024-05-28 PROCEDURE — 99213 OFFICE O/P EST LOW 20 MIN: CPT

## 2024-07-01 ENCOUNTER — TELEPHONE (OUTPATIENT)
Dept: PRIMARY CARE | Facility: CLINIC | Age: 64
End: 2024-07-01
Payer: MEDICARE

## 2024-07-01 DIAGNOSIS — E78.00 PURE HYPERCHOLESTEROLEMIA: ICD-10-CM

## 2024-07-01 DIAGNOSIS — Z79.899 MEDICATION MANAGEMENT: ICD-10-CM

## 2024-07-01 DIAGNOSIS — R73.01 IMPAIRED FASTING GLUCOSE: ICD-10-CM

## 2024-09-05 DIAGNOSIS — E78.00 HYPERCHOLESTEREMIA: ICD-10-CM

## 2024-09-06 RX ORDER — ATORVASTATIN CALCIUM 10 MG/1
10 TABLET, FILM COATED ORAL DAILY
Qty: 90 TABLET | Refills: 3 | Status: SHIPPED | OUTPATIENT
Start: 2024-09-06

## 2024-09-11 PROBLEM — M17.0 OSTEOARTHRITIS OF BOTH KNEES: Status: RESOLVED | Noted: 2023-01-06 | Resolved: 2024-09-11

## 2024-09-11 PROBLEM — M54.50 LOW BACK PAIN: Status: RESOLVED | Noted: 2020-08-26 | Resolved: 2024-09-11

## 2024-09-11 PROBLEM — R73.01 IMPAIRED FASTING GLUCOSE: Status: RESOLVED | Noted: 2018-11-06 | Resolved: 2024-09-11

## 2024-09-11 PROBLEM — R30.0 DYSURIA: Status: RESOLVED | Noted: 2018-11-12 | Resolved: 2024-09-11

## 2024-09-11 PROBLEM — R10.30 LOWER ABDOMINAL PAIN: Status: RESOLVED | Noted: 2021-10-28 | Resolved: 2024-09-11

## 2024-09-11 PROBLEM — R03.0 FINDING OF ABOVE NORMAL BLOOD PRESSURE: Status: RESOLVED | Noted: 2023-07-13 | Resolved: 2024-09-11

## 2024-09-11 PROBLEM — R10.2 PELVIC PAIN: Status: RESOLVED | Noted: 2020-08-26 | Resolved: 2024-09-11

## 2024-09-11 PROBLEM — E78.5 DYSLIPIDEMIA: Status: RESOLVED | Noted: 2024-09-11 | Resolved: 2024-09-11
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
## 2024-09-11 PROBLEM — M25.561 PAIN IN RIGHT KNEE: Status: RESOLVED | Noted: 2018-10-31 | Resolved: 2024-09-11

## 2024-09-11 PROBLEM — Z01.818 PRE-OP EVALUATION: Status: RESOLVED | Noted: 2024-04-11 | Resolved: 2024-09-11

## 2024-09-11 PROBLEM — N64.4 PAIN OF RIGHT BREAST: Status: RESOLVED | Noted: 2018-07-30 | Resolved: 2024-09-11

## 2024-09-11 PROBLEM — M17.9 OSTEOARTHRITIS OF KNEE: Status: RESOLVED | Noted: 2023-01-06 | Resolved: 2024-09-11

## 2024-09-11 PROBLEM — R07.89 CHEST WALL PAIN: Status: RESOLVED | Noted: 2024-09-11 | Resolved: 2024-09-11

## 2024-09-11 PROBLEM — M54.41 LUMBAGO WITH SCIATICA, RIGHT SIDE: Status: RESOLVED | Noted: 2018-11-20 | Resolved: 2024-09-11

## 2024-09-11 PROBLEM — R73.01 FASTING HYPERGLYCEMIA: Status: RESOLVED | Noted: 2018-11-14 | Resolved: 2024-09-11

## 2024-09-11 PROBLEM — K21.9 GERD (GASTROESOPHAGEAL REFLUX DISEASE): Status: ACTIVE | Noted: 2018-11-05

## 2024-09-11 PROBLEM — K21.00 GASTRO-ESOPHAGEAL REFLUX DISEASE WITH ESOPHAGITIS: Status: RESOLVED | Noted: 2018-11-05 | Resolved: 2024-09-11

## 2024-09-11 PROBLEM — J30.2 SEASONAL ALLERGIES: Status: RESOLVED | Noted: 2019-11-12 | Resolved: 2024-09-11

## 2024-09-11 PROBLEM — E66.9 OBESITY WITH BODY MASS INDEX 30 OR GREATER: Status: RESOLVED | Noted: 2024-09-11 | Resolved: 2024-09-11

## 2024-09-11 RX ORDER — CLOTRIMAZOLE AND BETAMETHASONE DIPROPIONATE 10; .64 MG/G; MG/G
CREAM TOPICAL DAILY
COMMUNITY
Start: 2024-05-21

## 2024-09-17 ENCOUNTER — TELEPHONE (OUTPATIENT)
Dept: PRIMARY CARE | Facility: CLINIC | Age: 64
End: 2024-09-17

## 2024-09-17 ENCOUNTER — APPOINTMENT (OUTPATIENT)
Dept: PRIMARY CARE | Facility: CLINIC | Age: 64
End: 2024-09-17
Payer: MEDICARE

## 2024-09-17 VITALS
OXYGEN SATURATION: 97 % | RESPIRATION RATE: 16 BRPM | WEIGHT: 182 LBS | HEART RATE: 99 BPM | DIASTOLIC BLOOD PRESSURE: 94 MMHG | BODY MASS INDEX: 31.22 KG/M2 | SYSTOLIC BLOOD PRESSURE: 148 MMHG

## 2024-09-17 DIAGNOSIS — I10 PRIMARY HYPERTENSION: ICD-10-CM

## 2024-09-17 DIAGNOSIS — I10 PRIMARY HYPERTENSION: Primary | ICD-10-CM

## 2024-09-17 PROCEDURE — 3077F SYST BP >= 140 MM HG: CPT | Performed by: FAMILY MEDICINE

## 2024-09-17 PROCEDURE — 1036F TOBACCO NON-USER: CPT | Performed by: FAMILY MEDICINE

## 2024-09-17 PROCEDURE — 3080F DIAST BP >= 90 MM HG: CPT | Performed by: FAMILY MEDICINE

## 2024-09-17 PROCEDURE — 1159F MED LIST DOCD IN RCRD: CPT | Performed by: FAMILY MEDICINE

## 2024-09-17 PROCEDURE — 1126F AMNT PAIN NOTED NONE PRSNT: CPT | Performed by: FAMILY MEDICINE

## 2024-09-17 PROCEDURE — 99213 OFFICE O/P EST LOW 20 MIN: CPT | Performed by: FAMILY MEDICINE

## 2024-09-17 RX ORDER — LOSARTAN POTASSIUM 50 MG/1
50 TABLET ORAL DAILY
Qty: 30 TABLET | Refills: 2 | Status: SHIPPED | OUTPATIENT
Start: 2024-09-17 | End: 2025-09-17

## 2024-09-17 ASSESSMENT — ENCOUNTER SYMPTOMS
DEPRESSION: 0
OCCASIONAL FEELINGS OF UNSTEADINESS: 0
HEADACHES: 0
SWEATS: 0
HYPERTENSION: 1
SHORTNESS OF BREATH: 0
NECK PAIN: 0
LOSS OF SENSATION IN FEET: 0
ORTHOPNEA: 0
PND: 0
BLURRED VISION: 0
PALPITATIONS: 0

## 2024-09-17 ASSESSMENT — PATIENT HEALTH QUESTIONNAIRE - PHQ9
SUM OF ALL RESPONSES TO PHQ9 QUESTIONS 1 AND 2: 0
2. FEELING DOWN, DEPRESSED OR HOPELESS: NOT AT ALL
1. LITTLE INTEREST OR PLEASURE IN DOING THINGS: NOT AT ALL

## 2024-09-17 ASSESSMENT — PAIN SCALES - GENERAL: PAINLEVEL: 0-NO PAIN

## 2024-09-17 NOTE — ASSESSMENT & PLAN NOTE
Will start losartan 50 daily.  Continue cutting back on salt and increase exercise.  Recheck in 6 weeks.  At that time we will check BMP.

## 2024-09-17 NOTE — TELEPHONE ENCOUNTER
FT-10/29/2024  HTN-Patient has future labs in epic for her CPE on 2/4/2025           Do you want additional labs for this appointment?

## 2024-09-17 NOTE — PROGRESS NOTES
Subjective   Patient ID: Mague Hummel is a 64 y.o. female who presents for Hypertension (Patient is here for elevated blood pressure during eye procedure, patient refused the flu shot).    HPI   She is here for elevated blood pressure.  She is on no medication.  Over the past several years she has had borderline high blood pressure.  Last week she had cataract surgery and during surgery her blood pressure was recorded at 117/113.  After the procedure came down to 160/90.  She has been monitoring her blood pressure ever since and it has been running in the 140/80-90 range.  She denies any chest pain or shortness of breath.  Over the past few years we discussed exercise regularly and cutting back on salt in her diet.  Review of Systems  Denies headache, blurred vision, chest pain, shortness of breath, nausea or vomiting, change in bowel habits or leg pain or swelling.    Objective   BP (!) 148/94 (BP Location: Left arm, Patient Position: Sitting, BP Cuff Size: Large adult)   Pulse 99   Resp 16   Wt 82.6 kg (182 lb)   SpO2 97%   BMI 31.22 kg/m²     Physical Exam  Vitals and nurse's notes reviewed  General: no acute distress  HEENT: Normal  Neck: Supple  Lungs: Clear  Cardio: RRR w/o murmur  Extremities: No edema, no calf tenderness  Neuro: Alert and oriented with no focal deficits    Assessment/Plan   Problem List Items Addressed This Visit             ICD-10-CM       High    Primary hypertension - Primary I10     Will start losartan 50 daily.  Continue cutting back on salt and increase exercise.  Recheck in 6 weeks.  At that time we will check BMP.         Relevant Medications    losartan (Cozaar) 50 mg tablet    Other Relevant Orders    Basic metabolic panel

## 2024-10-22 ENCOUNTER — LAB (OUTPATIENT)
Dept: LAB | Facility: LAB | Age: 64
End: 2024-10-22
Payer: MEDICARE

## 2024-10-22 DIAGNOSIS — I10 PRIMARY HYPERTENSION: ICD-10-CM

## 2024-10-22 LAB
ANION GAP SERPL CALCULATED.3IONS-SCNC: 10 MMOL/L (ref 10–20)
BUN SERPL-MCNC: 12 MG/DL (ref 6–23)
CALCIUM SERPL-MCNC: 9.4 MG/DL (ref 8.6–10.3)
CHLORIDE SERPL-SCNC: 103 MMOL/L (ref 98–107)
CO2 SERPL-SCNC: 30 MMOL/L (ref 21–32)
CREAT SERPL-MCNC: 0.8 MG/DL (ref 0.5–1.05)
EGFRCR SERPLBLD CKD-EPI 2021: 81 ML/MIN/1.73M*2
GLUCOSE SERPL-MCNC: 100 MG/DL (ref 74–99)
POTASSIUM SERPL-SCNC: 4.2 MMOL/L (ref 3.5–5.3)
SODIUM SERPL-SCNC: 139 MMOL/L (ref 136–145)

## 2024-10-22 PROCEDURE — 36415 COLL VENOUS BLD VENIPUNCTURE: CPT

## 2024-10-22 PROCEDURE — 80048 BASIC METABOLIC PNL TOTAL CA: CPT

## 2024-10-28 ASSESSMENT — ENCOUNTER SYMPTOMS
SHORTNESS OF BREATH: 0
SWEATS: 0
HEADACHES: 0
NECK PAIN: 0
PALPITATIONS: 0
PND: 0
BLURRED VISION: 0
ORTHOPNEA: 0
HYPERTENSION: 1

## 2024-10-29 ENCOUNTER — APPOINTMENT (OUTPATIENT)
Dept: PRIMARY CARE | Facility: CLINIC | Age: 64
End: 2024-10-29
Payer: MEDICARE

## 2024-10-29 VITALS
SYSTOLIC BLOOD PRESSURE: 140 MMHG | DIASTOLIC BLOOD PRESSURE: 68 MMHG | RESPIRATION RATE: 16 BRPM | OXYGEN SATURATION: 96 % | BODY MASS INDEX: 30.2 KG/M2 | WEIGHT: 176 LBS | TEMPERATURE: 98.1 F | HEART RATE: 104 BPM

## 2024-10-29 DIAGNOSIS — I10 PRIMARY HYPERTENSION: Primary | ICD-10-CM

## 2024-10-29 PROCEDURE — 1159F MED LIST DOCD IN RCRD: CPT | Performed by: FAMILY MEDICINE

## 2024-10-29 PROCEDURE — 3077F SYST BP >= 140 MM HG: CPT | Performed by: FAMILY MEDICINE

## 2024-10-29 PROCEDURE — 1123F ACP DISCUSS/DSCN MKR DOCD: CPT | Performed by: FAMILY MEDICINE

## 2024-10-29 PROCEDURE — 3078F DIAST BP <80 MM HG: CPT | Performed by: FAMILY MEDICINE

## 2024-10-29 PROCEDURE — 1036F TOBACCO NON-USER: CPT | Performed by: FAMILY MEDICINE

## 2024-10-29 PROCEDURE — 99213 OFFICE O/P EST LOW 20 MIN: CPT | Performed by: FAMILY MEDICINE

## 2024-10-29 PROCEDURE — 1158F ADVNC CARE PLAN TLK DOCD: CPT | Performed by: FAMILY MEDICINE

## 2024-10-29 PROCEDURE — 1125F AMNT PAIN NOTED PAIN PRSNT: CPT | Performed by: FAMILY MEDICINE

## 2024-10-29 RX ORDER — LOSARTAN POTASSIUM 100 MG/1
100 TABLET ORAL DAILY
Qty: 30 TABLET | Refills: 3 | Status: SHIPPED | OUTPATIENT
Start: 2024-10-29 | End: 2025-10-29

## 2024-10-29 ASSESSMENT — ENCOUNTER SYMPTOMS
DEPRESSION: 0
LOSS OF SENSATION IN FEET: 0
OCCASIONAL FEELINGS OF UNSTEADINESS: 0

## 2024-10-29 ASSESSMENT — PAIN SCALES - GENERAL: PAINLEVEL_OUTOF10: 5

## 2025-01-03 ENCOUNTER — OFFICE VISIT (OUTPATIENT)
Dept: URGENT CARE | Age: 65
End: 2025-01-03
Payer: MEDICARE

## 2025-01-03 ENCOUNTER — TELEPHONE (OUTPATIENT)
Dept: PRIMARY CARE | Facility: CLINIC | Age: 65
End: 2025-01-03
Payer: MEDICARE

## 2025-01-03 VITALS
WEIGHT: 174 LBS | TEMPERATURE: 97.9 F | RESPIRATION RATE: 16 BRPM | OXYGEN SATURATION: 96 % | HEART RATE: 93 BPM | SYSTOLIC BLOOD PRESSURE: 128 MMHG | BODY MASS INDEX: 29.85 KG/M2 | DIASTOLIC BLOOD PRESSURE: 78 MMHG

## 2025-01-03 DIAGNOSIS — R30.0 DYSURIA: ICD-10-CM

## 2025-01-03 DIAGNOSIS — N30.01 ACUTE CYSTITIS WITH HEMATURIA: Primary | ICD-10-CM

## 2025-01-03 LAB
POC APPEARANCE, URINE: CLEAR
POC BILIRUBIN, URINE: NEGATIVE
POC BLOOD, URINE: ABNORMAL
POC COLOR, URINE: ABNORMAL
POC GLUCOSE, URINE: NEGATIVE MG/DL
POC KETONES, URINE: NEGATIVE MG/DL
POC LEUKOCYTES, URINE: ABNORMAL
POC NITRITE,URINE: NEGATIVE
POC PH, URINE: 6 PH
POC PROTEIN, URINE: NEGATIVE MG/DL
POC SPECIFIC GRAVITY, URINE: 1.01
POC UROBILINOGEN, URINE: 0.2 EU/DL

## 2025-01-03 RX ORDER — CEPHALEXIN 500 MG/1
500 CAPSULE ORAL 2 TIMES DAILY
Qty: 14 CAPSULE | Refills: 0 | Status: SHIPPED | OUTPATIENT
Start: 2025-01-03 | End: 2025-01-10

## 2025-01-03 ASSESSMENT — ENCOUNTER SYMPTOMS
FREQUENCY: 1
DYSURIA: 1
DIFFICULTY URINATING: 1
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
## 2025-01-30 LAB
ALBUMIN SERPL-MCNC: 4.6 G/DL (ref 3.6–5.1)
ALBUMIN/CREAT UR: 8 MG/G CREAT
ALP SERPL-CCNC: 60 U/L (ref 37–153)
ALT SERPL-CCNC: 14 U/L (ref 6–29)
ANION GAP SERPL CALCULATED.4IONS-SCNC: 11 MMOL/L (CALC) (ref 7–17)
APPEARANCE UR: CLEAR
AST SERPL-CCNC: 16 U/L (ref 10–35)
BASOPHILS # BLD AUTO: 95 CELLS/UL (ref 0–200)
BASOPHILS NFR BLD AUTO: 0.9 %
BILIRUB SERPL-MCNC: 0.4 MG/DL (ref 0.2–1.2)
BILIRUB UR QL STRIP: NEGATIVE
BUN SERPL-MCNC: 11 MG/DL (ref 7–25)
CALCIUM SERPL-MCNC: 9.8 MG/DL (ref 8.6–10.4)
CHLORIDE SERPL-SCNC: 99 MMOL/L (ref 98–110)
CHOLEST SERPL-MCNC: 176 MG/DL
CHOLEST/HDLC SERPL: 4.4 (CALC)
CO2 SERPL-SCNC: 28 MMOL/L (ref 20–32)
COLOR UR: YELLOW
CREAT SERPL-MCNC: 0.73 MG/DL (ref 0.5–1.05)
CREAT UR-MCNC: 52 MG/DL (ref 20–275)
EGFRCR SERPLBLD CKD-EPI 2021: 91 ML/MIN/1.73M2
EOSINOPHIL # BLD AUTO: 179 CELLS/UL (ref 15–500)
EOSINOPHIL NFR BLD AUTO: 1.7 %
ERYTHROCYTE [DISTWIDTH] IN BLOOD BY AUTOMATED COUNT: 11.7 % (ref 11–15)
EST. AVERAGE GLUCOSE BLD GHB EST-MCNC: 120 MG/DL
EST. AVERAGE GLUCOSE BLD GHB EST-SCNC: 6.6 MMOL/L
GLUCOSE SERPL-MCNC: 91 MG/DL (ref 65–99)
GLUCOSE UR QL STRIP: NEGATIVE
HBA1C MFR BLD: 5.8 % OF TOTAL HGB
HCT VFR BLD AUTO: 39.7 % (ref 35–45)
HDLC SERPL-MCNC: 40 MG/DL
HGB BLD-MCNC: 12.9 G/DL (ref 11.7–15.5)
HGB UR QL STRIP: NEGATIVE
KETONES UR QL STRIP: NEGATIVE
LDLC SERPL CALC-MCNC: 105 MG/DL (CALC)
LEUKOCYTE ESTERASE UR QL STRIP: NEGATIVE
LYMPHOCYTES # BLD AUTO: 3623 CELLS/UL (ref 850–3900)
LYMPHOCYTES NFR BLD AUTO: 34.5 %
MCH RBC QN AUTO: 32.4 PG (ref 27–33)
MCHC RBC AUTO-ENTMCNC: 32.5 G/DL (ref 32–36)
MCV RBC AUTO: 99.7 FL (ref 80–100)
MICROALBUMIN UR-MCNC: 0.4 MG/DL
MONOCYTES # BLD AUTO: 746 CELLS/UL (ref 200–950)
MONOCYTES NFR BLD AUTO: 7.1 %
NEUTROPHILS # BLD AUTO: 5859 CELLS/UL (ref 1500–7800)
NEUTROPHILS NFR BLD AUTO: 55.8 %
NITRITE UR QL STRIP: NEGATIVE
NONHDLC SERPL-MCNC: 136 MG/DL (CALC)
PH UR STRIP: 6 [PH] (ref 5–8)
PLATELET # BLD AUTO: 245 THOUSAND/UL (ref 140–400)
PMV BLD REES-ECKER: 10.1 FL (ref 7.5–12.5)
POTASSIUM SERPL-SCNC: 4.4 MMOL/L (ref 3.5–5.3)
PROT SERPL-MCNC: 7.5 G/DL (ref 6.1–8.1)
PROT UR QL STRIP: NEGATIVE
RBC # BLD AUTO: 3.98 MILLION/UL (ref 3.8–5.1)
SODIUM SERPL-SCNC: 138 MMOL/L (ref 135–146)
SP GR UR STRIP: 1.01 (ref 1–1.03)
TRIGL SERPL-MCNC: 195 MG/DL
WBC # BLD AUTO: 10.5 THOUSAND/UL (ref 3.8–10.8)

## 2025-02-04 ENCOUNTER — APPOINTMENT (OUTPATIENT)
Dept: PRIMARY CARE | Facility: CLINIC | Age: 65
End: 2025-02-04
Payer: MEDICARE

## 2025-02-04 VITALS
RESPIRATION RATE: 16 BRPM | SYSTOLIC BLOOD PRESSURE: 130 MMHG | OXYGEN SATURATION: 96 % | WEIGHT: 174 LBS | DIASTOLIC BLOOD PRESSURE: 78 MMHG | HEIGHT: 64 IN | HEART RATE: 87 BPM | BODY MASS INDEX: 29.71 KG/M2

## 2025-02-04 DIAGNOSIS — R10.31 RIGHT LOWER QUADRANT ABDOMINAL PAIN: ICD-10-CM

## 2025-02-04 DIAGNOSIS — Z00.00 WELL ADULT EXAM: ICD-10-CM

## 2025-02-04 DIAGNOSIS — Z12.31 ENCOUNTER FOR SCREENING MAMMOGRAM FOR BREAST CANCER: ICD-10-CM

## 2025-02-04 DIAGNOSIS — Z12.31 ENCOUNTER FOR SCREENING MAMMOGRAM FOR MALIGNANT NEOPLASM OF BREAST: ICD-10-CM

## 2025-02-04 DIAGNOSIS — I10 PRIMARY HYPERTENSION: ICD-10-CM

## 2025-02-04 DIAGNOSIS — E78.00 PURE HYPERCHOLESTEROLEMIA: Primary | ICD-10-CM

## 2025-02-04 DIAGNOSIS — R73.01 IMPAIRED FASTING GLUCOSE: ICD-10-CM

## 2025-02-04 DIAGNOSIS — K21.00 GASTROESOPHAGEAL REFLUX DISEASE WITH ESOPHAGITIS WITHOUT HEMORRHAGE: ICD-10-CM

## 2025-02-04 PROCEDURE — 1123F ACP DISCUSS/DSCN MKR DOCD: CPT | Performed by: FAMILY MEDICINE

## 2025-02-04 PROCEDURE — 1126F AMNT PAIN NOTED NONE PRSNT: CPT | Performed by: FAMILY MEDICINE

## 2025-02-04 PROCEDURE — G0439 PPPS, SUBSEQ VISIT: HCPCS | Performed by: FAMILY MEDICINE

## 2025-02-04 PROCEDURE — 3075F SYST BP GE 130 - 139MM HG: CPT | Performed by: FAMILY MEDICINE

## 2025-02-04 PROCEDURE — 3078F DIAST BP <80 MM HG: CPT | Performed by: FAMILY MEDICINE

## 2025-02-04 PROCEDURE — 3008F BODY MASS INDEX DOCD: CPT | Performed by: FAMILY MEDICINE

## 2025-02-04 PROCEDURE — 1036F TOBACCO NON-USER: CPT | Performed by: FAMILY MEDICINE

## 2025-02-04 PROCEDURE — 1170F FXNL STATUS ASSESSED: CPT | Performed by: FAMILY MEDICINE

## 2025-02-04 PROCEDURE — 99212 OFFICE O/P EST SF 10 MIN: CPT | Performed by: FAMILY MEDICINE

## 2025-02-04 PROCEDURE — 99397 PER PM REEVAL EST PAT 65+ YR: CPT | Performed by: FAMILY MEDICINE

## 2025-02-04 RX ORDER — LOSARTAN POTASSIUM 100 MG/1
100 TABLET ORAL DAILY
Qty: 90 TABLET | Refills: 3 | Status: SHIPPED | OUTPATIENT
Start: 2025-02-04

## 2025-02-04 ASSESSMENT — ACTIVITIES OF DAILY LIVING (ADL)
BATHING: INDEPENDENT
TAKING_MEDICATION: INDEPENDENT
DRESSING: INDEPENDENT
MANAGING_FINANCES: INDEPENDENT
DOING_HOUSEWORK: INDEPENDENT
GROCERY_SHOPPING: INDEPENDENT

## 2025-02-04 ASSESSMENT — ENCOUNTER SYMPTOMS
DEPRESSION: 0
LOSS OF SENSATION IN FEET: 0
OCCASIONAL FEELINGS OF UNSTEADINESS: 0

## 2025-02-04 ASSESSMENT — PAIN SCALES - GENERAL: PAINLEVEL_OUTOF10: 0-NO PAIN

## 2025-02-04 NOTE — PROGRESS NOTES
Subjective   Reason for Visit: Allyssa Hummel is an 64 y.o. female here for a Medicare Wellness visit.               HPI    Patient Care Team:  Marquise Pelletier MD as PCP - General (Family Medicine)  Marquise Pelletier MD as PCP - [employer] PCP  Marquise Pelletier MD as Primary Care Provider      Had benign breast mass (right) removed in 2/06. Colonoscopy in 2020 was normal by Dr. Goldstein. Repeat due in 2025 (family HX).   GYN history -. Last Pap was in 2021 and was normal . HPV was negative. Last period was in 2008. She has not had vaginal bleeding since then.  She had a uterine ablation in 2007.   An ultrasound in 2020 showed a uterine fibroid.  Since she currently is asymptomatic with that no treatment is necessary at this time.     2. ALLYSSA is seen today also for follow up of High cholesterol.  She is on atorvastatin 10 mg. Recent LDL is 105.     3. ALLYSSA is seen today also for follow up of allergies.  She takes Allegra. She also uses albuterol MDI PRN.     4. ALLYSSA is seen for also for follow up for GERD.  She is doing well on omeprazole.     5. ALLYSSA is seen also for follow up of Osteoarthritis. She is status post right knee replacement by Dr. Eller in 7/2024.  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 She takes etodolac.      6.  ALLYSSA is also here for HTN.  She is on losartan 100 mg. (Started in 8/24, increased in 10/24).  Home BPs have been in the 130s/70s.  She notes a drop in blood pressure when she stopped taking regular ibuprofen.      7.  ALLYSSA  is also here for elevated sugar.  No history of diabetes.  There is a family history of diabetes. Recent FBS is 97. Recent A1C 5.8.    8. She is here for intermittent abdominal pain.  It is in the right lower abdomen/groin area.  She has had this in relief for over 20 years.  More recently has been a little more persistent.  No nausea or vomiting.  In the past she was told it may be an ovarian cyst.  She never had an  ultrasound.    Review of Systems  Denies headache, blurred vision, chest pain, shortness of breath, nausea or vomiting, change in bowel habits or leg pain or swelling.    Objective   Vitals:  There were no vitals taken for this visit.      Physical Exam  General appearance: Comfortable.  She is well-nourished, well-developed.  She is awake, alert, and oriented and appears her stated age.The patient is cooperative with exam.  Head: Hair pattern reveals a normal pattern for patient's age and face shows no abnormalities.  eyes: PERRLA, EOMI x2, conjunctival and sclera are clear.   Nose: No polyps, ulcerations, or lesions.  Throat: Oral mucosa reveals no abnormalities and teeth are in good repair.  Neck:  Supple without lymphadenopathy.  No thyromegaly or carotid bruits.  Lungs: Clear to auscultation bilaterally with no wheezes, rales or rhonchi.  Chest Wall: No abnormalities  Breast: Bilateral breasts are symmetrical without masses or discharge or tenderness.  Cardio:Regular rate and rhythm with no murmurs.  No friction rubs.  No carotid bruits.  Abdomen: Abdomen is soft, nontender, no masses and no hepatosplenomegaly.  Genitourinary: Labia reveal no lesions.  Vaginal mucosa reveals no abnormalities.  Cervix reveals no abnormalities.  Negative chandelier sign.  Uterus is normal in size, shape and position.Bilateral adnexa reveals no masses or tenderness.  Lymph nodes: Bilateral axillary lymph nodes and inguinal nodes are unremarkable.  Musculoskeletal: Bilateral upper and lower extremities are equal in strength at 5/5.  Skin: Good turgor and no rashes.  Neurological: Intact and nonfocal.  Cranial nerves II through XII are grossly intact.  Psychiatric: Patient has appropriate judgment.  Patient has good insight.  Patient's mood is appropriate.    Assessment & Plan  Primary hypertension    Orders:    losartan (Cozaar) 100 mg tablet; Take 1 tablet (100 mg) by mouth once daily.    Pure hypercholesterolemia  Continue  losartan 100.  Continue cutting back on salt in diet.  Continue increase exercise.  Recheck in 6 months.         Encounter for screening mammogram for breast cancer         Encounter for screening mammogram for malignant neoplasm of breast    Orders:    BI mammo bilateral screening tomosynthesis; Future    Gastroesophageal reflux disease with esophagitis without hemorrhage  Continue omeprazole.  Follow-up in 6 months.         Impaired fasting glucose  Keep off medication.  Continue low-carb diet.  Recheck A1c in 6 months.         Well adult exam  Anticipatory guidance given.  Will order mammogram.         Right lower quadrant abdominal pain  Her exam was unremarkable and did not suggest an etiology for this pain.  We talked about evaluation.  She prefers to wait until she has her colonoscopy scheduled for next week.  If her symptoms persist beyond that she will let me know.  At that time would order a pelvic ultrasound.

## 2025-02-04 NOTE — ASSESSMENT & PLAN NOTE
Continue losartan 100.  Continue cutting back on salt in diet.  Continue increase exercise.  Recheck in 6 months.

## 2025-02-04 NOTE — ASSESSMENT & PLAN NOTE
Her exam was unremarkable and did not suggest an etiology for this pain.  We talked about evaluation.  She prefers to wait until she has her colonoscopy scheduled for next week.  If her symptoms persist beyond that she will let me know.  At that time would order a pelvic ultrasound.

## 2025-02-06 ENCOUNTER — APPOINTMENT (OUTPATIENT)
Dept: PRIMARY CARE | Facility: CLINIC | Age: 65
End: 2025-02-06
Payer: MEDICARE

## 2025-02-10 ENCOUNTER — HOSPITAL ENCOUNTER (OUTPATIENT)
Dept: RADIOLOGY | Facility: CLINIC | Age: 65
Discharge: HOME | End: 2025-02-10
Payer: MEDICARE

## 2025-02-10 VITALS — WEIGHT: 174 LBS | BODY MASS INDEX: 29.71 KG/M2 | HEIGHT: 64 IN

## 2025-02-10 DIAGNOSIS — Z12.31 ENCOUNTER FOR SCREENING MAMMOGRAM FOR MALIGNANT NEOPLASM OF BREAST: ICD-10-CM

## 2025-02-10 PROCEDURE — 77063 BREAST TOMOSYNTHESIS BI: CPT | Performed by: RADIOLOGY

## 2025-02-10 PROCEDURE — 77067 SCR MAMMO BI INCL CAD: CPT | Performed by: RADIOLOGY

## 2025-02-10 PROCEDURE — 77063 BREAST TOMOSYNTHESIS BI: CPT

## 2025-06-29 DIAGNOSIS — E78.00 HYPERCHOLESTEREMIA: ICD-10-CM

## 2025-06-30 RX ORDER — ATORVASTATIN CALCIUM 10 MG/1
10 TABLET, FILM COATED ORAL DAILY
Qty: 90 TABLET | Refills: 3 | Status: SHIPPED | OUTPATIENT
Start: 2025-06-30

## 2025-07-09 ENCOUNTER — TELEPHONE (OUTPATIENT)
Dept: PRIMARY CARE | Facility: CLINIC | Age: 65
End: 2025-07-09
Payer: MEDICARE

## 2025-07-09 DIAGNOSIS — E78.00 PURE HYPERCHOLESTEROLEMIA: ICD-10-CM

## 2025-07-09 DIAGNOSIS — I10 PRIMARY HYPERTENSION: ICD-10-CM

## 2025-07-09 DIAGNOSIS — R73.01 IMPAIRED FASTING GLUCOSE: ICD-10-CM

## 2025-07-21 ENCOUNTER — LAB REQUISITION (OUTPATIENT)
Dept: LAB | Facility: HOSPITAL | Age: 65
End: 2025-07-21
Payer: MEDICARE

## 2025-07-21 DIAGNOSIS — H47.012 ISCHEMIC OPTIC NEUROPATHY, LEFT EYE: ICD-10-CM

## 2025-07-21 LAB
CRP SERPL-MCNC: 0.44 MG/DL
ERYTHROCYTE [SEDIMENTATION RATE] IN BLOOD BY WESTERGREN METHOD: 19 MM/H (ref 0–30)
PLATELET # BLD AUTO: 256 X10*3/UL (ref 150–450)

## 2025-07-21 PROCEDURE — 85652 RBC SED RATE AUTOMATED: CPT

## 2025-07-21 PROCEDURE — 86140 C-REACTIVE PROTEIN: CPT

## 2025-07-21 PROCEDURE — 85049 AUTOMATED PLATELET COUNT: CPT

## 2025-07-22 DIAGNOSIS — R73.01 IMPAIRED FASTING GLUCOSE: ICD-10-CM

## 2025-07-22 DIAGNOSIS — I10 PRIMARY HYPERTENSION: ICD-10-CM

## 2025-07-22 DIAGNOSIS — E78.00 PURE HYPERCHOLESTEROLEMIA: ICD-10-CM

## 2025-07-31 LAB
ANION GAP SERPL CALCULATED.4IONS-SCNC: 11 MMOL/L (CALC) (ref 7–17)
BUN SERPL-MCNC: 8 MG/DL (ref 7–25)
BUN/CREAT SERPL: NORMAL (CALC) (ref 6–22)
CALCIUM SERPL-MCNC: 9.6 MG/DL (ref 8.6–10.4)
CHLORIDE SERPL-SCNC: 101 MMOL/L (ref 98–110)
CHOLEST SERPL-MCNC: 168 MG/DL
CHOLEST/HDLC SERPL: 5.1 (CALC)
CO2 SERPL-SCNC: 28 MMOL/L (ref 20–32)
CREAT SERPL-MCNC: 0.75 MG/DL (ref 0.5–1.05)
EGFRCR SERPLBLD CKD-EPI 2021: 87 ML/MIN/1.73M2
EST. AVERAGE GLUCOSE BLD GHB EST-MCNC: 126 MG/DL
EST. AVERAGE GLUCOSE BLD GHB EST-SCNC: 7 MMOL/L
GLUCOSE SERPL-MCNC: 97 MG/DL (ref 65–99)
HBA1C MFR BLD: 6 %
HDLC SERPL-MCNC: 33 MG/DL
LDLC SERPL CALC-MCNC: 94 MG/DL (CALC)
NONHDLC SERPL-MCNC: 135 MG/DL (CALC)
POTASSIUM SERPL-SCNC: 4 MMOL/L (ref 3.5–5.3)
SODIUM SERPL-SCNC: 140 MMOL/L (ref 135–146)
TRIGL SERPL-MCNC: 311 MG/DL

## 2025-08-04 ASSESSMENT — ENCOUNTER SYMPTOMS
HYPERTENSION: 1
BLURRED VISION: 1

## 2025-08-05 ENCOUNTER — APPOINTMENT (OUTPATIENT)
Dept: PRIMARY CARE | Facility: CLINIC | Age: 65
End: 2025-08-05
Payer: MEDICARE

## 2025-08-05 VITALS
BODY MASS INDEX: 29.64 KG/M2 | WEIGHT: 170 LBS | SYSTOLIC BLOOD PRESSURE: 154 MMHG | HEART RATE: 100 BPM | DIASTOLIC BLOOD PRESSURE: 64 MMHG | OXYGEN SATURATION: 96 %

## 2025-08-05 DIAGNOSIS — I10 PRIMARY HYPERTENSION: ICD-10-CM

## 2025-08-05 DIAGNOSIS — K21.00 GASTROESOPHAGEAL REFLUX DISEASE WITH ESOPHAGITIS WITHOUT HEMORRHAGE: Primary | ICD-10-CM

## 2025-08-05 DIAGNOSIS — E78.00 PURE HYPERCHOLESTEROLEMIA: ICD-10-CM

## 2025-08-05 DIAGNOSIS — R73.01 IMPAIRED FASTING GLUCOSE: ICD-10-CM

## 2025-08-05 PROCEDURE — 3078F DIAST BP <80 MM HG: CPT | Performed by: FAMILY MEDICINE

## 2025-08-05 PROCEDURE — 1126F AMNT PAIN NOTED NONE PRSNT: CPT | Performed by: FAMILY MEDICINE

## 2025-08-05 PROCEDURE — 3077F SYST BP >= 140 MM HG: CPT | Performed by: FAMILY MEDICINE

## 2025-08-05 PROCEDURE — 1159F MED LIST DOCD IN RCRD: CPT | Performed by: FAMILY MEDICINE

## 2025-08-05 PROCEDURE — 99214 OFFICE O/P EST MOD 30 MIN: CPT | Performed by: FAMILY MEDICINE

## 2025-08-05 PROCEDURE — 1036F TOBACCO NON-USER: CPT | Performed by: FAMILY MEDICINE

## 2025-08-05 ASSESSMENT — PAIN SCALES - GENERAL: PAINLEVEL_OUTOF10: 0-NO PAIN

## 2025-08-05 ASSESSMENT — PATIENT HEALTH QUESTIONNAIRE - PHQ9
1. LITTLE INTEREST OR PLEASURE IN DOING THINGS: NOT AT ALL
2. FEELING DOWN, DEPRESSED OR HOPELESS: NOT AT ALL
SUM OF ALL RESPONSES TO PHQ9 QUESTIONS 1 AND 2: 0

## 2025-08-05 NOTE — ASSESSMENT & PLAN NOTE
Keep off medication.  Continue cut back on carbohydrates.  Exercise regular.  Recheck in 6 months at CPE.

## 2025-08-05 NOTE — PROGRESS NOTES
Subjective   Patient ID: Mague Hummel is a 65 y.o. female who presents for lab review.    HPI   1. MAGUE is seen today  for follow up of High cholesterol.  She is on atorvastatin 10 mg. Recent LDL is 94     2. MAGUE is seen for also for follow up for GERD.  She is doing well on omeprazole.    3.  MAGUE is also here for HTN.  She is on losartan 100 mg. (Started in 8/24, increased in 10/24).  Home BPs have been in the 130s/70s.  Repeat BP by me is 142/72.      4.  MAGUE  is also here for elevated sugar.  No history of diabetes.  There is a family history of diabetes. Recent FBS is 97. Recent A1C 6.0.  Review of Systems    Objective   /64 (BP Location: Left arm)   Pulse 100   Wt 77.1 kg (170 lb)   SpO2 96%   BMI 29.64 kg/m²     Physical Exam    Assessment/Plan   Assessment & Plan  Gastroesophageal reflux disease with esophagitis without hemorrhage  Continue omeprazole.  Recheck in 6 months.         Impaired fasting glucose  Keep off medication.  Continue cut back on carbohydrates.  Exercise regular.  Recheck in 6 months at CPE.         Pure hypercholesterolemia  Continue atorvastatin.  Continue cutting back on fats in diet.  Recheck in 6 months at CPE.         Primary hypertension  Continue losartan.  Continue cutting back on salt in diet.  Continue monitoring home BP.  Recheck in 6 months at CPE.

## 2025-08-05 NOTE — ASSESSMENT & PLAN NOTE
Continue losartan.  Continue cutting back on salt in diet.  Continue monitoring home BP.  Recheck in 6 months at CPE.

## 2025-08-05 NOTE — ASSESSMENT & PLAN NOTE
Continue atorvastatin.  Continue cutting back on fats in diet.  Recheck in 6 months at CPE.

## 2026-02-05 ENCOUNTER — APPOINTMENT (OUTPATIENT)
Dept: PRIMARY CARE | Facility: CLINIC | Age: 66
End: 2026-02-05
Payer: MEDICARE

## (undated) DEVICE — GLOVE, SURGICAL, PROTEXIS PI , 7.5, PF, LF

## (undated) DEVICE — TIP, SUCTION, FRAZIER, W/CONTROL VENT, 12 FR

## (undated) DEVICE — ADHESIVE, SKIN, DERMABOND ADVANCED, 15CM, PEN-STYLE

## (undated) DEVICE — SUTURE, MONOCRYL, 2-0, 36 IN, CT-1, UNDYED

## (undated) DEVICE — WOUND SYSTEM, DEBRIDEMENT & CLEANING, O.R DUOPAK

## (undated) DEVICE — IRRIGATION SYSTEM, WOUND, SURGIPHOR, 450ML, STERILE

## (undated) DEVICE — Device

## (undated) DEVICE — CUFF, TOURNIQUET, 30 X 4, DUAL PORT/SNGL BLADDER, DISP, LF

## (undated) DEVICE — PEN, SKIN MARKER FOR TREPHINES & PUNCHES

## (undated) DEVICE — RESERVOIR, WOUND, W/TROCAR, PVC, MEDIUM, 400CC, DAVOL, 1/8 IN, 10FR

## (undated) DEVICE — STOCKINETTE, TUBULAR, BIAS CUT, 1 PLY, 4 IN X 50 YD, COTTON, NS

## (undated) DEVICE — SOLUTION, IRRIGATION, USP, SODIUM CHLORIDE 0.9%, 3000 ML, BAG

## (undated) DEVICE — DRESSING, ADHESIVE, PRIMAPORE, 4 X 8, WHITE, STERILE

## (undated) DEVICE — GLOVE, SURGICAL, PROTEXIS PI BLUE W/NEUTHERA, 8.0, PF, LF

## (undated) DEVICE — HANDPIECE, INTERPULSE, W/RETRACTABLE COAX FAN, STERILE

## (undated) DEVICE — DRESSING, MEPILEX BORDER, POST-OP AG, 4 X 12 IN

## (undated) DEVICE — SUTURE, MONOCRYL, 4-0, 27 IN, PS-2, UNDYED

## (undated) DEVICE — CATHETER TRAY, URETHRAL, FOLEY, 16 FR, SILICONE

## (undated) DEVICE — CONTAINER, SPECIMEN, 5 OZ, STERILE

## (undated) DEVICE — SUTURE, PDS II, 0 36 IN, CT-1, VIOLET

## (undated) DEVICE — SUTURE, PDSII, 1, TP-1, VIL, MONO, 48LP